# Patient Record
Sex: MALE | Race: OTHER | HISPANIC OR LATINO | Employment: FULL TIME | URBAN - METROPOLITAN AREA
[De-identification: names, ages, dates, MRNs, and addresses within clinical notes are randomized per-mention and may not be internally consistent; named-entity substitution may affect disease eponyms.]

---

## 2017-04-07 ENCOUNTER — HOSPITAL ENCOUNTER (EMERGENCY)
Facility: HOSPITAL | Age: 27
Discharge: HOME | End: 2017-04-07
Attending: EMERGENCY MEDICINE | Admitting: EMERGENCY MEDICINE
Payer: COMMERCIAL

## 2017-04-07 ENCOUNTER — APPOINTMENT (EMERGENCY)
Dept: RADIOLOGY | Facility: HOSPITAL | Age: 27
End: 2017-04-07
Payer: COMMERCIAL

## 2017-04-07 VITALS
TEMPERATURE: 97.6 F | SYSTOLIC BLOOD PRESSURE: 139 MMHG | HEART RATE: 84 BPM | RESPIRATION RATE: 18 BRPM | OXYGEN SATURATION: 97 % | DIASTOLIC BLOOD PRESSURE: 75 MMHG | WEIGHT: 263 LBS

## 2017-04-07 DIAGNOSIS — R51 HEADACHE(784.0): Primary | ICD-10-CM

## 2017-04-07 LAB
ANION GAP BLD CALC-SCNC: 18 MMOL/L (ref 4–13)
BUN BLD-MCNC: 11 MG/DL (ref 5–25)
CA-I BLD-SCNC: 1.19 MMOL/L (ref 1.12–1.32)
CHLORIDE BLD-SCNC: 101 MMOL/L (ref 100–108)
CREAT BLD-MCNC: 0.7 MG/DL (ref 0.6–1.3)
GFR SERPL CREATININE-BSD FRML MDRD: >60 ML/MIN/1.73SQ M
GLUCOSE SERPL-MCNC: 104 MG/DL (ref 65–140)
HCT VFR BLD CALC: 48 % (ref 36.5–49.3)
HGB BLDA-MCNC: 16.3 G/DL (ref 12–17)
PCO2 BLD: 26 MMOL/L (ref 21–32)
POTASSIUM BLD-SCNC: 3.5 MMOL/L (ref 3.5–5.3)
SODIUM BLD-SCNC: 141 MMOL/L (ref 136–145)
SPECIMEN SOURCE: ABNORMAL

## 2017-04-07 PROCEDURE — 70496 CT ANGIOGRAPHY HEAD: CPT

## 2017-04-07 PROCEDURE — 85014 HEMATOCRIT: CPT

## 2017-04-07 PROCEDURE — 99284 EMERGENCY DEPT VISIT MOD MDM: CPT

## 2017-04-07 PROCEDURE — 70498 CT ANGIOGRAPHY NECK: CPT

## 2017-04-07 PROCEDURE — 96375 TX/PRO/DX INJ NEW DRUG ADDON: CPT

## 2017-04-07 PROCEDURE — 96361 HYDRATE IV INFUSION ADD-ON: CPT

## 2017-04-07 PROCEDURE — 80047 BASIC METABLC PNL IONIZED CA: CPT

## 2017-04-07 PROCEDURE — 96374 THER/PROPH/DIAG INJ IV PUSH: CPT

## 2017-04-07 RX ORDER — KETOROLAC TROMETHAMINE 30 MG/ML
15 INJECTION, SOLUTION INTRAMUSCULAR; INTRAVENOUS ONCE
Status: COMPLETED | OUTPATIENT
Start: 2017-04-07 | End: 2017-04-07

## 2017-04-07 RX ORDER — DIPHENHYDRAMINE HYDROCHLORIDE 50 MG/ML
25 INJECTION INTRAMUSCULAR; INTRAVENOUS ONCE
Status: COMPLETED | OUTPATIENT
Start: 2017-04-07 | End: 2017-04-07

## 2017-04-07 RX ORDER — METOCLOPRAMIDE HYDROCHLORIDE 5 MG/ML
10 INJECTION INTRAMUSCULAR; INTRAVENOUS ONCE
Status: COMPLETED | OUTPATIENT
Start: 2017-04-07 | End: 2017-04-07

## 2017-04-07 RX ADMIN — KETOROLAC TROMETHAMINE 15 MG: 30 INJECTION, SOLUTION INTRAMUSCULAR at 16:41

## 2017-04-07 RX ADMIN — SODIUM CHLORIDE 1000 ML: 0.9 INJECTION, SOLUTION INTRAVENOUS at 16:20

## 2017-04-07 RX ADMIN — DIPHENHYDRAMINE HYDROCHLORIDE 25 MG: 50 INJECTION, SOLUTION INTRAMUSCULAR; INTRAVENOUS at 16:40

## 2017-04-07 RX ADMIN — METOCLOPRAMIDE 10 MG: 5 INJECTION, SOLUTION INTRAMUSCULAR; INTRAVENOUS at 16:41

## 2017-04-07 RX ADMIN — IOHEXOL 90 ML: 350 INJECTION, SOLUTION INTRAVENOUS at 17:12

## 2020-09-05 ENCOUNTER — CLINICAL SUPPORT (OUTPATIENT)
Dept: URGENT CARE | Age: 30
End: 2020-09-05

## 2020-09-05 ENCOUNTER — TRANSCRIBE ORDERS (OUTPATIENT)
Dept: ADMINISTRATIVE | Age: 30
End: 2020-09-05

## 2020-09-05 DIAGNOSIS — R11.2 NAUSEA AND VOMITING, INTRACTABILITY OF VOMITING NOT SPECIFIED, UNSPECIFIED VOMITING TYPE: ICD-10-CM

## 2020-09-05 DIAGNOSIS — R11.2 NAUSEA AND VOMITING, INTRACTABILITY OF VOMITING NOT SPECIFIED, UNSPECIFIED VOMITING TYPE: Primary | ICD-10-CM

## 2020-09-05 PROCEDURE — U0003 INFECTIOUS AGENT DETECTION BY NUCLEIC ACID (DNA OR RNA); SEVERE ACUTE RESPIRATORY SYNDROME CORONAVIRUS 2 (SARS-COV-2) (CORONAVIRUS DISEASE [COVID-19]), AMPLIFIED PROBE TECHNIQUE, MAKING USE OF HIGH THROUGHPUT TECHNOLOGIES AS DESCRIBED BY CMS-2020-01-R: HCPCS

## 2020-09-07 LAB — SARS-COV-2 RNA SPEC QL NAA+PROBE: NOT DETECTED

## 2020-09-09 ENCOUNTER — TELEPHONE (OUTPATIENT)
Dept: URGENT CARE | Age: 30
End: 2020-09-09

## 2021-04-30 ENCOUNTER — HOSPITAL ENCOUNTER (OUTPATIENT)
Dept: RADIOLOGY | Facility: HOSPITAL | Age: 31
Discharge: HOME | End: 2021-04-30
Payer: COMMERCIAL

## 2021-04-30 ENCOUNTER — TRANSCRIBE ORDERS (OUTPATIENT)
Dept: ADMINISTRATIVE | Facility: HOSPITAL | Age: 31
End: 2021-04-30

## 2021-04-30 DIAGNOSIS — M54.2 CERVICALGIA: ICD-10-CM

## 2021-04-30 DIAGNOSIS — M54.2 CERVICALGIA: Primary | ICD-10-CM

## 2021-04-30 PROCEDURE — 72050 X-RAY EXAM NECK SPINE 4/5VWS: CPT

## 2021-06-11 ENCOUNTER — APPOINTMENT (OUTPATIENT)
Dept: LAB | Facility: HOSPITAL | Age: 31
End: 2021-06-11
Payer: COMMERCIAL

## 2021-06-11 ENCOUNTER — TRANSCRIBE ORDERS (OUTPATIENT)
Dept: ADMINISTRATIVE | Facility: HOSPITAL | Age: 31
End: 2021-06-11

## 2021-06-11 DIAGNOSIS — E78.5 HYPERLIPIDEMIA, UNSPECIFIED HYPERLIPIDEMIA TYPE: ICD-10-CM

## 2021-06-11 DIAGNOSIS — Z86.711 PERSONAL HISTORY OF PE (PULMONARY EMBOLISM): ICD-10-CM

## 2021-06-11 DIAGNOSIS — D64.9 ANEMIA, UNSPECIFIED TYPE: Primary | ICD-10-CM

## 2021-06-11 DIAGNOSIS — K76.0 FATTY METAMORPHOSIS OF LIVER: ICD-10-CM

## 2021-06-11 DIAGNOSIS — D64.9 ANEMIA, UNSPECIFIED TYPE: ICD-10-CM

## 2021-06-11 DIAGNOSIS — M12.9 ACUTE ARTHROPATHY: ICD-10-CM

## 2021-06-11 LAB
ALBUMIN SERPL BCP-MCNC: 4.4 G/DL (ref 3.4–4.8)
ALP SERPL-CCNC: 48.9 U/L (ref 10–129)
ALT SERPL W P-5'-P-CCNC: 40 U/L (ref 5–63)
ANION GAP SERPL CALCULATED.3IONS-SCNC: 8 MMOL/L (ref 4–13)
AST SERPL W P-5'-P-CCNC: 21 U/L (ref 15–41)
BASOPHILS # BLD AUTO: 0.03 THOUSANDS/ΜL (ref 0–0.1)
BASOPHILS NFR BLD AUTO: 0 % (ref 0–1)
BILIRUB SERPL-MCNC: 0.52 MG/DL (ref 0.3–1.2)
BUN SERPL-MCNC: 14 MG/DL (ref 6–20)
CALCIUM SERPL-MCNC: 9.2 MG/DL (ref 8.4–10.2)
CHLORIDE SERPL-SCNC: 104 MMOL/L (ref 96–108)
CHOLEST SERPL-MCNC: 252 MG/DL
CO2 SERPL-SCNC: 27 MMOL/L (ref 22–33)
CREAT SERPL-MCNC: 0.86 MG/DL (ref 0.5–1.2)
EOSINOPHIL # BLD AUTO: 0.19 THOUSAND/ΜL (ref 0–0.61)
EOSINOPHIL NFR BLD AUTO: 2 % (ref 0–6)
ERYTHROCYTE [DISTWIDTH] IN BLOOD BY AUTOMATED COUNT: 13.2 % (ref 11.6–15.1)
GFR SERPL CREATININE-BSD FRML MDRD: 117 ML/MIN/1.73SQ M
GLUCOSE P FAST SERPL-MCNC: 89 MG/DL (ref 70–105)
HCT VFR BLD AUTO: 45.5 % (ref 36.5–49.3)
HDLC SERPL-MCNC: 38 MG/DL
HGB BLD-MCNC: 15.6 G/DL (ref 12–17)
IMM GRANULOCYTES # BLD AUTO: 0.01 THOUSAND/UL (ref 0–0.2)
IMM GRANULOCYTES NFR BLD AUTO: 0 % (ref 0–2)
LDLC SERPL CALC-MCNC: 164 MG/DL (ref 0–100)
LYMPHOCYTES # BLD AUTO: 3.24 THOUSANDS/ΜL (ref 0.6–4.47)
LYMPHOCYTES NFR BLD AUTO: 31 % (ref 14–44)
MCH RBC QN AUTO: 30.2 PG (ref 26.8–34.3)
MCHC RBC AUTO-ENTMCNC: 34.3 G/DL (ref 31.4–37.4)
MCV RBC AUTO: 88 FL (ref 82–98)
MONOCYTES # BLD AUTO: 1.1 THOUSAND/ΜL (ref 0.17–1.22)
MONOCYTES NFR BLD AUTO: 10 % (ref 4–12)
NEUTROPHILS # BLD AUTO: 6.02 THOUSANDS/ΜL (ref 1.85–7.62)
NEUTS SEG NFR BLD AUTO: 57 % (ref 43–75)
NONHDLC SERPL-MCNC: 214 MG/DL
PLATELET # BLD AUTO: 201 THOUSANDS/UL (ref 149–390)
PMV BLD AUTO: 11.5 FL (ref 8.9–12.7)
POTASSIUM SERPL-SCNC: 3.9 MMOL/L (ref 3.5–5)
PROT SERPL-MCNC: 7.5 G/DL (ref 6.4–8.3)
RBC # BLD AUTO: 5.16 MILLION/UL (ref 3.88–5.62)
SODIUM SERPL-SCNC: 139 MMOL/L (ref 133–145)
TRIGL SERPL-MCNC: 251.1 MG/DL
TSH SERPL DL<=0.05 MIU/L-ACNC: 0.76 UIU/ML (ref 0.34–5.6)
URATE SERPL-MCNC: 5.8 MG/DL (ref 3.4–8.7)
WBC # BLD AUTO: 10.59 THOUSAND/UL (ref 4.31–10.16)

## 2021-06-11 PROCEDURE — 84443 ASSAY THYROID STIM HORMONE: CPT

## 2021-06-11 PROCEDURE — 85025 COMPLETE CBC W/AUTO DIFF WBC: CPT

## 2021-06-11 PROCEDURE — 80061 LIPID PANEL: CPT

## 2021-06-11 PROCEDURE — 86038 ANTINUCLEAR ANTIBODIES: CPT

## 2021-06-11 PROCEDURE — 80053 COMPREHEN METABOLIC PANEL: CPT

## 2021-06-11 PROCEDURE — 82607 VITAMIN B-12: CPT

## 2021-06-11 PROCEDURE — 86141 C-REACTIVE PROTEIN HS: CPT

## 2021-06-11 PROCEDURE — 84550 ASSAY OF BLOOD/URIC ACID: CPT

## 2021-06-11 PROCEDURE — 36415 COLL VENOUS BLD VENIPUNCTURE: CPT

## 2021-06-12 LAB
CRP SERPL HS-MCNC: 1.85 MG/L
VIT B12 SERPL-MCNC: 986 PG/ML (ref 100–900)

## 2021-06-14 LAB — RYE IGE QN: NEGATIVE

## 2021-08-18 ENCOUNTER — OFFICE VISIT (OUTPATIENT)
Dept: URGENT CARE | Age: 31
End: 2021-08-18
Payer: COMMERCIAL

## 2021-08-18 VITALS
TEMPERATURE: 96.8 F | HEIGHT: 67 IN | HEART RATE: 83 BPM | WEIGHT: 255 LBS | BODY MASS INDEX: 40.02 KG/M2 | OXYGEN SATURATION: 97 %

## 2021-08-18 DIAGNOSIS — Z11.59 SPECIAL SCREENING EXAMINATION FOR VIRAL DISEASE: Primary | ICD-10-CM

## 2021-08-18 PROCEDURE — 99213 OFFICE O/P EST LOW 20 MIN: CPT | Performed by: PHYSICIAN ASSISTANT

## 2021-08-18 PROCEDURE — U0003 INFECTIOUS AGENT DETECTION BY NUCLEIC ACID (DNA OR RNA); SEVERE ACUTE RESPIRATORY SYNDROME CORONAVIRUS 2 (SARS-COV-2) (CORONAVIRUS DISEASE [COVID-19]), AMPLIFIED PROBE TECHNIQUE, MAKING USE OF HIGH THROUGHPUT TECHNOLOGIES AS DESCRIBED BY CMS-2020-01-R: HCPCS | Performed by: PHYSICIAN ASSISTANT

## 2021-08-18 PROCEDURE — U0005 INFEC AGEN DETEC AMPLI PROBE: HCPCS | Performed by: PHYSICIAN ASSISTANT

## 2021-08-18 RX ORDER — ATORVASTATIN CALCIUM 20 MG/1
TABLET, FILM COATED ORAL
COMMUNITY
Start: 2021-06-15

## 2021-08-18 RX ORDER — ALPRAZOLAM 0.5 MG/1
1 TABLET ORAL 3 TIMES DAILY
COMMUNITY
Start: 2021-08-04

## 2021-08-18 RX ORDER — FLUOXETINE HYDROCHLORIDE 40 MG/1
CAPSULE ORAL
COMMUNITY
Start: 2021-08-04

## 2021-08-18 NOTE — LETTER
August 18, 2021     Patient: Louvella Pill   YOB: 1990   Date of Visit: 8/18/2021       To Whom It May Concern: It is my medical opinion that Geo Severe   Should remain out of work until test results are available  If you have any questions or concerns, please don't hesitate to call           Sincerely,        Joel Miranda PA-C    CC: No Recipients

## 2021-08-18 NOTE — PROGRESS NOTES
[facility]        NAME: Rosalio Guardado is a 36 y o  male  : 1990    MRN: [id number]  DATE: 2021  TIME: 6:47 PM    Assessment and Plan   Special screening examination for viral disease [Z11 59]  1  Special screening examination for viral disease  Novel Coronavirus (Covid-19),[facility]         Patient Instructions     Follow up with PCP in 3-5 days  Proceed to  ER if symptoms worsen  Chief Complaint     Chief Complaint   Patient presents with    Cold Like Symptoms     Pt c/o cough, chills and sore throat x two days  Pt exposed to girlfriend who tested positive for Covid-19 today  Pt not vaccinated against Covid-19  No OTC meds taken  History of Present Illness        21-year-old male presents for likely COVID symptoms  Patient states his girlfriend tested positive for COVID  He states she became sick on Monday and got her results today  He states he started not feeling well on Monday as well  He is complaining of coughing, chills and sweats  He denies any shortness of breath or chest pain  Denies any known fever  Patient is not vaccine and has never had COVID previously  Patient's girlfriend is not vaccinated either  Review of Systems   Review of Systems   Constitutional: Positive for chills, diaphoresis and fatigue  Negative for activity change, appetite change and fever  HENT: Negative  Eyes: Negative  Respiratory: Positive for cough  Negative for apnea, chest tightness, shortness of breath, wheezing and stridor  Cardiovascular: Negative  Gastrointestinal: Negative  Musculoskeletal: Positive for myalgias  Skin: Negative  Neurological: Negative for headaches           Current Medications       Current Outpatient Medications:     ALPRAZolam (XANAX) 0 5 mg tablet, Take 1 mg by mouth 3 (three) times a day, Disp: , Rfl:     atorvastatin (LIPITOR) 20 mg tablet, , Disp: , Rfl:     FLUoxetine (PROzac) 40 MG capsule, , Disp: , Rfl:     Current Allergies     Allergies as of 08/18/2021    (No Known Allergies)            The following portions of the patient's history were reviewed and updated as appropriate: allergies, current medications, past family history, past medical history, past social history, past surgical history and problem list     Objective   Pulse 83   Temp (!) 96 8 °F (36 °C)   Ht 5' 7" (1 702 m)   Wt 116 kg (255 lb)   SpO2 97%   BMI 39 94 kg/m²        Physical Exam     Physical Exam  Vitals and nursing note reviewed  Constitutional:       General: He is not in acute distress  Appearance: He is not ill-appearing  HENT:      Head: Normocephalic and atraumatic  Right Ear: Tympanic membrane, ear canal and external ear normal       Left Ear: Tympanic membrane, ear canal and external ear normal       Nose: Nose normal  No congestion or rhinorrhea  Mouth/Throat:      Mouth: Mucous membranes are moist       Pharynx: No oropharyngeal exudate or posterior oropharyngeal erythema  Eyes:      Conjunctiva/sclera: Conjunctivae normal    Cardiovascular:      Rate and Rhythm: Normal rate and regular rhythm  Pulmonary:      Effort: Pulmonary effort is normal       Breath sounds: Normal breath sounds  Lymphadenopathy:      Cervical: No cervical adenopathy  Neurological:      Mental Status: He is alert

## 2021-08-18 NOTE — PATIENT INSTRUCTIONS
COVID testing was obtained today  Isolate until test results are available  You may view your results on MyChart  If you do not have a MyChart, you can create one with the activation code you were given today  If results are negative and feeling well, may resume normal activities  If results are positive, will need to isolate for 10-14 days from onset of symptoms  Any worsening of symptoms especially any difficulty breathing go to the emergency room  Follow-up with your family doctor for further treatment if needed    You may take vitamin-D3 2000 IU daily                          vitamin-C 1 gram every 12 hours                         Multivitamin daily

## 2021-08-19 LAB — SARS-COV-2 RNA RESP QL NAA+PROBE: POSITIVE

## 2023-09-13 ENCOUNTER — HOSPITAL ENCOUNTER (EMERGENCY)
Facility: HOSPITAL | Age: 33
Discharge: HOME | End: 2023-09-13
Attending: EMERGENCY MEDICINE | Admitting: EMERGENCY MEDICINE
Payer: COMMERCIAL

## 2023-09-13 VITALS
SYSTOLIC BLOOD PRESSURE: 138 MMHG | OXYGEN SATURATION: 98 % | HEART RATE: 100 BPM | RESPIRATION RATE: 18 BRPM | BODY MASS INDEX: 40.05 KG/M2 | TEMPERATURE: 100 F | WEIGHT: 255.73 LBS | DIASTOLIC BLOOD PRESSURE: 87 MMHG

## 2023-09-13 DIAGNOSIS — J02.0 STREP PHARYNGITIS: Primary | ICD-10-CM

## 2023-09-13 DIAGNOSIS — M79.10 MYALGIA: ICD-10-CM

## 2023-09-13 DIAGNOSIS — R68.89 FLU-LIKE SYMPTOMS: ICD-10-CM

## 2023-09-13 DIAGNOSIS — R50.9 FEVER: ICD-10-CM

## 2023-09-13 LAB
S PYO DNA THROAT QL NAA+PROBE: DETECTED
SARS-COV-2 RNA RESP QL NAA+PROBE: NEGATIVE

## 2023-09-13 PROCEDURE — 99284 EMERGENCY DEPT VISIT MOD MDM: CPT | Performed by: EMERGENCY MEDICINE

## 2023-09-13 PROCEDURE — 87635 SARS-COV-2 COVID-19 AMP PRB: CPT | Performed by: EMERGENCY MEDICINE

## 2023-09-13 PROCEDURE — 94640 AIRWAY INHALATION TREATMENT: CPT

## 2023-09-13 PROCEDURE — 96372 THER/PROPH/DIAG INJ SC/IM: CPT

## 2023-09-13 PROCEDURE — 99283 EMERGENCY DEPT VISIT LOW MDM: CPT

## 2023-09-13 PROCEDURE — 87651 STREP A DNA AMP PROBE: CPT | Performed by: EMERGENCY MEDICINE

## 2023-09-13 RX ORDER — ALBUTEROL SULFATE 90 UG/1
2 AEROSOL, METERED RESPIRATORY (INHALATION) EVERY 6 HOURS PRN
Qty: 18 G | Refills: 0 | Status: SHIPPED | OUTPATIENT
Start: 2023-09-13

## 2023-09-13 RX ORDER — AMOXICILLIN 500 MG/1
500 CAPSULE ORAL EVERY 12 HOURS SCHEDULED
Qty: 14 CAPSULE | Refills: 0 | Status: SHIPPED | OUTPATIENT
Start: 2023-09-13 | End: 2023-09-20

## 2023-09-13 RX ORDER — KETOROLAC TROMETHAMINE 30 MG/ML
30 INJECTION, SOLUTION INTRAMUSCULAR; INTRAVENOUS ONCE
Status: COMPLETED | OUTPATIENT
Start: 2023-09-13 | End: 2023-09-13

## 2023-09-13 RX ADMIN — KETOROLAC TROMETHAMINE 30 MG: 30 INJECTION INTRAMUSCULAR; INTRAVENOUS at 08:36

## 2023-09-13 RX ADMIN — ALBUTEROL SULFATE 5 MG: 2.5 SOLUTION RESPIRATORY (INHALATION) at 08:08

## 2023-09-13 RX ADMIN — Medication 16 MG: at 08:06

## 2023-09-13 RX ADMIN — IPRATROPIUM BROMIDE 0.5 MG: 0.5 SOLUTION RESPIRATORY (INHALATION) at 08:09

## 2023-09-13 NOTE — Clinical Note
Cierra Chamorro was seen and treated in our emergency department on 9/13/2023. Diagnosis:     Finesse Arellano  may return to work on return date. He may return on this date: 09/18/2023         If you have any questions or concerns, please don't hesitate to call.       Cole Encarnacion, DO    ______________________________           _______________          _______________  Hospital Representative                              Date                                Time

## 2023-09-13 NOTE — ED PROVIDER NOTES
History  Chief Complaint   Patient presents with   • Generalized Body Aches     Started yesterday with body aches shaking headache and fever      70-year-old male complaining of 1 day history of flulike symptoms including body aches, headache, fever, sore throat. Also complains of occasional dry cough. Denies n/v or chest pain. Denies any neck pain. He states he has children that are sick with strep pharyngitis. No recent travel. Patient not immunocompromise. Patient is a smoker. Prior to Admission Medications   Prescriptions Last Dose Informant Patient Reported? Taking? ALPRAZolam (XANAX) 0.5 mg tablet Not Taking  Yes No   Sig: Take 1 mg by mouth 3 (three) times a day   Patient not taking: Reported on 9/13/2023   FLUoxetine (PROzac) 40 MG capsule Not Taking  Yes No   Patient not taking: Reported on 9/13/2023   atorvastatin (LIPITOR) 20 mg tablet Not Taking  Yes No   Patient not taking: Reported on 9/13/2023      Facility-Administered Medications: None       Past Medical History:   Diagnosis Date   • Anxiety    • Asthma    • Depression        Past Surgical History:   Procedure Laterality Date   • TONSILLECTOMY         History reviewed. No pertinent family history. I have reviewed and agree with the history as documented. E-Cigarette/Vaping   • E-Cigarette Use Never User      E-Cigarette/Vaping Substances     Social History     Tobacco Use   • Smoking status: Never   Vaping Use   • Vaping Use: Never used   Substance Use Topics   • Alcohol use: Yes     Comment: rare   • Drug use: No       Review of Systems   Constitutional: Negative for activity change, appetite change, chills and fever. HENT: Positive for rhinorrhea and sore throat. Negative for ear pain, hearing loss, sneezing and trouble swallowing. Eyes: Negative for pain and visual disturbance. Respiratory: Positive for cough. Negative for choking, chest tightness, shortness of breath, wheezing and stridor.     Cardiovascular: Negative for chest pain, palpitations and leg swelling. Gastrointestinal: Negative for abdominal pain, constipation, diarrhea, nausea and vomiting. Genitourinary: Negative for difficulty urinating, dysuria, frequency, hematuria and testicular pain. Musculoskeletal: Positive for myalgias. Negative for arthralgias, back pain, gait problem and neck pain. Skin: Negative for color change and rash. Allergic/Immunologic: Negative for immunocompromised state. Neurological: Negative for dizziness, seizures, syncope, speech difficulty, weakness, light-headedness, numbness and headaches. Psychiatric/Behavioral: Negative for confusion. The patient is not nervous/anxious. All other systems reviewed and are negative. Physical Exam  Physical Exam  Vitals and nursing note reviewed. Constitutional:       General: He is not in acute distress. Appearance: Normal appearance. He is well-developed and normal weight. He is not ill-appearing, toxic-appearing or diaphoretic. HENT:      Head: Normocephalic and atraumatic. Right Ear: Tympanic membrane, ear canal and external ear normal. There is no impacted cerumen. Left Ear: Tympanic membrane, ear canal and external ear normal. There is no impacted cerumen. Nose: Rhinorrhea present. Mouth/Throat:      Mouth: Mucous membranes are moist.      Pharynx: Oropharyngeal exudate and posterior oropharyngeal erythema present. Eyes:      General: No scleral icterus. Extraocular Movements: Extraocular movements intact. Conjunctiva/sclera: Conjunctivae normal.   Cardiovascular:      Rate and Rhythm: Normal rate and regular rhythm. Pulses: Normal pulses. Heart sounds: Normal heart sounds. No murmur heard. Pulmonary:      Effort: Pulmonary effort is normal. No respiratory distress. Breath sounds: Normal breath sounds. No wheezing or rales. Chest:      Chest wall: No tenderness.    Abdominal:      General: Bowel sounds are normal. There is no distension. Palpations: Abdomen is soft. There is no mass. Tenderness: There is no abdominal tenderness. There is no guarding or rebound. Musculoskeletal:         General: No swelling, tenderness or deformity. Normal range of motion. Cervical back: Normal range of motion and neck supple. No rigidity. Right lower leg: No edema. Left lower leg: No edema. Lymphadenopathy:      Cervical: Cervical adenopathy present. Skin:     General: Skin is warm and dry. Capillary Refill: Capillary refill takes less than 2 seconds. Findings: No erythema, lesion or rash. Neurological:      General: No focal deficit present. Mental Status: He is alert and oriented to person, place, and time. Motor: No abnormal muscle tone.    Psychiatric:         Mood and Affect: Mood normal.         Behavior: Behavior normal.         Vital Signs  ED Triage Vitals   Temperature Pulse Respirations Blood Pressure SpO2   09/13/23 0734 09/13/23 0734 09/13/23 0734 09/13/23 0741 09/13/23 0734   100 °F (37.8 °C) 100 18 138/87 98 %      Temp Source Heart Rate Source Patient Position - Orthostatic VS BP Location FiO2 (%)   09/13/23 0734 09/13/23 0734 09/13/23 0734 09/13/23 0734 --   Temporal Monitor Sitting Left arm       Pain Score       09/13/23 0734       9           Vitals:    09/13/23 0734 09/13/23 0741   BP:  138/87   Pulse: 100    Patient Position - Orthostatic VS: Sitting Sitting         Visual Acuity      ED Medications  Medications   dexamethasone oral liquid 16 mg 1.6 mL (16 mg Oral Given 9/13/23 0806)   albuterol inhalation solution 5 mg (5 mg Nebulization Given 9/13/23 0808)   ipratropium (ATROVENT) 0.02 % inhalation solution 0.5 mg (0.5 mg Nebulization Given 9/13/23 0809)   ketorolac (TORADOL) injection 30 mg (30 mg Intramuscular Given 9/13/23 0836)       Diagnostic Studies  Results Reviewed     Procedure Component Value Units Date/Time    COVID only [[id number]]  (Normal) Collected: 09/13/23 1775    Lab Status: Final result Specimen: Nares from Nose Updated: 09/13/23 0830     SARS-CoV-2 Negative    Narrative:      FOR PEDIATRIC PATIENTS - copy/paste COVID Guidelines URL to browser: [email or web address]    SARS-CoV-2 assay is a Nucleic Acid Amplification assay intended for the  qualitative detection of nucleic acid from SARS-CoV-2 in nasopharyngeal  swabs. Results are for the presumptive identification of SARS-CoV-2 RNA. Positive results are indicative of infection with SARS-CoV-2, the virus  causing COVID-19, but do not rule out bacterial infection or co-infection  with other viruses. Laboratories within the [facility] and its  territories are required to report all positive results to the appropriate  public health authorities. Negative results do not preclude SARS-CoV-2  infection and should not be used as the sole basis for treatment or other  patient management decisions. Negative results must be combined with  clinical observations, patient history, and epidemiological information. This test has not been FDA cleared or approved. This test has been authorized by FDA under an Emergency Use Authorization  (EUA). This test is only authorized for the duration of time the  declaration that circumstances exist justifying the authorization of the  emergency use of an in vitro diagnostic tests for detection of SARS-CoV-2  virus and/or diagnosis of COVID-19 infection under section 564(b)(1) of  the Act, 21 U. S.C. 384RFS-3(S)(3), unless the authorization is terminated  or revoked sooner. The test has been validated but independent review by FDA  and CLIA is pending. Test performed using [employer]: This RT-PCR assay targets N2,  a region unique to SARS-CoV-2. A conserved region in the E-gene was chosen  for pan-Sarbecovirus detection which includes SARS-CoV-2.     According to CMS-2020-01-R, this platform meets the definition of high-throughput technology. Strep A PCR [[id number]]  (Abnormal) Collected: 09/13/23 0750    Lab Status: Final result Specimen: Throat Updated: 09/13/23 0821     STREP A PCR Detected                 No orders to display              Procedures  Procedures         ED Course                               SBIRT 22yo+    Flowsheet Row Most Recent Value   Initial Alcohol Screen: US AUDIT-C     1. How often do you have a drink containing alcohol? 0 Filed at: 09/13/2023 0738   Audit-C Score 0 Filed at: 09/13/2023 8816   NORY: How many times in the past year have you. .. Used an illegal drug or used a prescription medication for non-medical reasons? Never Filed at: 09/13/2023 8686                    Medical Decision Making  79-year-old male presenting with flulike illness since yesterday. Fever: acute illness or injury  Flu-like symptoms: acute illness or injury  Myalgia: acute illness or injury  Strep pharyngitis: acute illness or injury  Amount and/or Complexity of Data Reviewed  Labs: ordered. Decision-making details documented in ED Course. Risk  OTC drugs. Prescription drug management. Risk Details: History strep swab is positive. Will treat with amoxicillin. Patient improved after nebulizer treatments. No evidence for sepsis, severe sepsis or septic shock.         Disposition  Final diagnoses:   Strep pharyngitis   Myalgia   Fever   Flu-like symptoms     Time reflects when diagnosis was documented in both MDM as applicable and the Disposition within this note     Time User Action Codes Description Comment    9/13/2023  8:32 AM Radene Steff T Add [J02.0] Strep pharyngitis     9/13/2023  8:32 AM Radene Steff T Add [M79.10] Myalgia     9/13/2023  8:32 AM Radene Steff T Add [R50.9] Fever     9/13/2023  8:32 AM Tripp Smack Add [R68.89] Flu-like symptoms       ED Disposition     ED Disposition   Discharge    Condition   Stable    Date/Time   Wed Sep 13, 2023  8:32 AM    Comment   Rosalba Kaiser Brian discharge to home/self care. Follow-up Information     Follow up With Specialties Details Why Shane Powell MD [facility] Schedule an appointment as soon as possible for a visit   [address] [facility]            Discharge Medication List as of 9/13/2023  8:33 AM      START taking these medications    Details   albuterol (Ventolin HFA) 90 mcg/act inhaler Inhale 2 puffs every 6 (six) hours as needed for wheezing, Starting Wed 9/13/2023, Normal      amoxicillin (AMOXIL) 500 mg capsule Take 1 capsule (500 mg total) by mouth every 12 (twelve) hours for 7 days, Starting Wed 9/13/2023, Until Wed 9/20/2023, Normal         CONTINUE these medications which have NOT CHANGED    Details   ALPRAZolam (XANAX) 0.5 mg tablet Take 1 mg by mouth 3 (three) times a day, Starting Wed 8/4/2021, Historical Med      atorvastatin (LIPITOR) 20 mg tablet Starting Tue 6/15/2021, Historical Med      FLUoxetine (PROzac) 40 MG capsule Starting Wed 8/4/2021, Historical Med             No discharge procedures on file.     PDMP Review     None          ED Provider  Electronically Signed by           Werner Larsen DO  09/13/23 1000

## 2023-09-13 NOTE — ED NOTES
Patient reports son has been diagnosed with strep throat and other son is being treated for MRSA     Peggy Cook RN  09/13/23 6090

## 2023-10-19 ENCOUNTER — OFFICE VISIT (OUTPATIENT)
Dept: URGENT CARE | Facility: CLINIC | Age: 33
End: 2023-10-19
Payer: COMMERCIAL

## 2023-10-19 VITALS
HEIGHT: 67 IN | RESPIRATION RATE: 18 BRPM | SYSTOLIC BLOOD PRESSURE: 136 MMHG | HEART RATE: 86 BPM | OXYGEN SATURATION: 100 % | DIASTOLIC BLOOD PRESSURE: 90 MMHG | WEIGHT: 246 LBS | BODY MASS INDEX: 38.61 KG/M2 | TEMPERATURE: 99 F

## 2023-10-19 DIAGNOSIS — J02.0 STREP PHARYNGITIS: Primary | ICD-10-CM

## 2023-10-19 DIAGNOSIS — J02.9 SORE THROAT: ICD-10-CM

## 2023-10-19 LAB — S PYO AG THROAT QL: NEGATIVE

## 2023-10-19 PROCEDURE — 87880 STREP A ASSAY W/OPTIC: CPT | Performed by: STUDENT IN AN ORGANIZED HEALTH CARE EDUCATION/TRAINING PROGRAM

## 2023-10-19 PROCEDURE — 99213 OFFICE O/P EST LOW 20 MIN: CPT | Performed by: STUDENT IN AN ORGANIZED HEALTH CARE EDUCATION/TRAINING PROGRAM

## 2023-10-19 RX ORDER — METHYLPREDNISOLONE 4 MG/1
TABLET ORAL
Qty: 21 EACH | Refills: 0 | Status: SHIPPED | OUTPATIENT
Start: 2023-10-19

## 2023-10-19 RX ORDER — PENICILLIN V POTASSIUM 500 MG/1
500 TABLET ORAL 2 TIMES DAILY
Qty: 20 TABLET | Refills: 0 | Status: SHIPPED | OUTPATIENT
Start: 2023-10-19 | End: 2023-10-29

## 2023-10-19 NOTE — PROGRESS NOTES
[facility]        NAME: Lashanda Weiss is a 34 y.o. male  : 1990    MRN: [id number]  DATE: 2023  TIME: 8:15 PM    Assessment and Orders   Strep pharyngitis [J02.0]  1. Strep pharyngitis  penicillin V potassium (VEETID) 500 mg tablet    methylPREDNISolone 4 MG tablet therapy pack      2. Sore throat  POCT rapid strepA    CANCELED: Throat culture            Plan and Discussion      Rapid strep is negative however patient has strep pos contact and the sides of the pharynx are extremely swollen with exudate. Will treat with oral penicillin to cover for strep infection. Will also treat with oral steroid to decreased swelling of the pharynx. Discussed ED precautions including (but not limited to)  Difficultly breathing or shortness of breath  Chest pain  Acutely worsening symptoms. Risks and benefits discussed. Patient understands and agrees with the plan. Follow up with PCP. Chief Complaint     Chief Complaint   Patient presents with    Sore Throat     Sore throat on left side for 3 days         History of Present Illness       Sore Throat   This is a new problem. The current episode started in the past 7 days. The problem has been unchanged. The pain is worse on the left side. There has been no fever. Associated symptoms include congestion and trouble swallowing. Pertinent negatives include no coughing or vomiting. He has had exposure to strep. Exposure to: son had strep. He has tried NSAIDs and acetaminophen for the symptoms. The treatment provided mild relief. Review of Systems   Review of Systems   HENT:  Positive for congestion, sore throat and trouble swallowing. Respiratory:  Negative for cough. Gastrointestinal:  Negative for vomiting.          Current Medications       Current Outpatient Medications:     methylPREDNISolone 4 MG tablet therapy pack, Use as directed on package, Disp: 21 each, Rfl: 0    penicillin V potassium (VEETID) 500 mg tablet, Take 1 tablet (500 mg total) by mouth 2 (two) times a day for 10 days, Disp: 20 tablet, Rfl: 0    albuterol (Ventolin HFA) 90 mcg/act inhaler, Inhale 2 puffs every 6 (six) hours as needed for wheezing (Patient not taking: Reported on 10/19/2023), Disp: 18 g, Rfl: 0    ALPRAZolam (XANAX) 0.5 mg tablet, Take 1 mg by mouth 3 (three) times a day (Patient not taking: Reported on 9/13/2023), Disp: , Rfl:     atorvastatin (LIPITOR) 20 mg tablet, , Disp: , Rfl:     FLUoxetine (PROzac) 40 MG capsule, , Disp: , Rfl:     Current Allergies     Allergies as of 10/19/2023    (No Known Allergies)            The following portions of the patient's history were reviewed and updated as appropriate: allergies, current medications, past family history, past medical history, past social history, past surgical history and problem list.     Past Medical History:   Diagnosis Date    Anxiety     Asthma     Depression        Past Surgical History:   Procedure Laterality Date    TONSILLECTOMY         No family history on file. Medications have been verified. Objective   /90   Pulse 86   Temp 99 °F (37.2 °C) (Temporal)   Resp 18   Ht 5' 7" (1.702 m)   Wt 112 kg (246 lb)   SpO2 100%   BMI 38.53 kg/m²   No LMP for male patient. Physical Exam     Physical Exam  Constitutional:       General: He is not in acute distress. Appearance: He is not ill-appearing. HENT:      Mouth/Throat:      Pharynx: Pharyngeal swelling and oropharyngeal exudate present. Comments: Tonsils are surgically absent   Pulmonary:      Effort: Pulmonary effort is normal. No respiratory distress. Breath sounds: No stridor. Lymphadenopathy:      Cervical: Cervical adenopathy present. Neurological:      General: No focal deficit present. Mental Status: He is alert and oriented to person, place, and time.    Psychiatric:         Mood and Affect: Mood normal.         Behavior: Behavior normal.               Kathy Herrera DO

## 2024-02-01 ENCOUNTER — OFFICE VISIT (OUTPATIENT)
Dept: URGENT CARE | Facility: CLINIC | Age: 34
End: 2024-02-01
Payer: COMMERCIAL

## 2024-02-01 VITALS
OXYGEN SATURATION: 97 % | DIASTOLIC BLOOD PRESSURE: 98 MMHG | RESPIRATION RATE: 20 BRPM | SYSTOLIC BLOOD PRESSURE: 141 MMHG | HEART RATE: 92 BPM | TEMPERATURE: 99 F

## 2024-02-01 DIAGNOSIS — L24.9 IRRITANT DERMATITIS: Primary | ICD-10-CM

## 2024-02-01 PROCEDURE — 99213 OFFICE O/P EST LOW 20 MIN: CPT | Performed by: STUDENT IN AN ORGANIZED HEALTH CARE EDUCATION/TRAINING PROGRAM

## 2024-02-01 RX ORDER — TRIAMCINOLONE ACETONIDE 1 MG/G
CREAM TOPICAL 2 TIMES DAILY
Qty: 15 G | Refills: 1 | Status: SHIPPED | OUTPATIENT
Start: 2024-02-01 | End: 2024-02-08

## 2024-02-02 NOTE — PROGRESS NOTES
"  [facility]        NAME: Chin Torres is a 33 y.o. male  : 1990    MRN: [id number]  DATE: 2024  TIME: 8:52 AM    Assessment and Plan   Irritant dermatitis [L24.9]  1. Irritant dermatitis  Ambulatory Referral to Dermatology    triamcinolone (KENALOG) 0.1 % cream            Patient Instructions       Follow up with PCP in 3-5 days.  Proceed to  ER if symptoms worsen.    Chief Complaint     Chief Complaint   Patient presents with   • Rash     C/o generalized body rash onset \"one week\". Pt reports utilizing \"benadryl, not improving\". Denies itching, denies drainage, skin intact. Pt denies any other symptomology. Denies PCP cotnact. Denies any OTC medications today. Pt reports one episode of vomiting \"today\", denies n/d/abdominal pain association. Denies any hx of rash.          History of Present Illness       Rash  This is a new problem. The current episode started in the past 7 days. The problem is unchanged. The affected locations include the chest, right arm and left arm. The problem is moderate. The rash is characterized by redness and itchiness. He was exposed to nothing. The rash first occurred at home. Pertinent negatives include no congestion, cough, fever, rhinorrhea, sore throat or vomiting. Past treatments include antihistamine. The treatment provided no relief.     Review of Systems   Review of Systems   Constitutional:  Negative for fever.   HENT:  Negative for congestion, rhinorrhea and sore throat.    Respiratory:  Negative for cough.    Gastrointestinal:  Negative for vomiting.   Skin:  Positive for rash.       Current Medications       Current Outpatient Medications:   •  triamcinolone (KENALOG) 0.1 % cream, Apply topically 2 (two) times a day for 7 days, Disp: 15 g, Rfl: 1  •  albuterol (Ventolin HFA) 90 mcg/act inhaler, Inhale 2 puffs every 6 (six) hours as needed for wheezing (Patient not taking: Reported on 10/19/2023), Disp: 18 g, Rfl: 0  •  ALPRAZolam (XANAX) 0.5 mg " tablet, Take 1 mg by mouth 3 (three) times a day (Patient not taking: Reported on 9/13/2023), Disp: , Rfl:   •  atorvastatin (LIPITOR) 20 mg tablet, , Disp: , Rfl:   •  FLUoxetine (PROzac) 40 MG capsule, , Disp: , Rfl:   •  methylPREDNISolone 4 MG tablet therapy pack, Use as directed on package, Disp: 21 each, Rfl: 0    Current Allergies     Allergies as of 02/01/2024   • (No Known Allergies)            The following portions of the patient's history were reviewed and updated as appropriate: allergies, current medications, past family history, past medical history, past social history, past surgical history and problem list.     Past Medical History:   Diagnosis Date   • Anxiety    • Asthma    • Depression        Past Surgical History:   Procedure Laterality Date   • TONSILLECTOMY         History reviewed. No pertinent family history.      Medications have been verified.        Objective   /98   Pulse 92   Temp 99 °F (37.2 °C) (Temporal)   Resp 20   SpO2 97%   No LMP for male patient.       Physical Exam     Physical Exam  Constitutional:       General: He is not in acute distress.     Appearance: He is well-developed. He is not toxic-appearing.   HENT:      Head: Normocephalic and atraumatic.      Right Ear: External ear normal.      Left Ear: External ear normal.   Eyes:      Extraocular Movements: Extraocular movements intact.   Cardiovascular:      Rate and Rhythm: Normal rate.   Pulmonary:      Effort: Pulmonary effort is normal.   Skin:     Comments: Dry maculopapular rash on arms, upper chest, neck under beard   Neurological:      General: No focal deficit present.      Mental Status: He is alert and oriented to person, place, and time.   Psychiatric:         Mood and Affect: Mood normal.

## 2024-04-17 ENCOUNTER — HOSPITAL ENCOUNTER (OUTPATIENT)
Dept: CT IMAGING | Facility: HOSPITAL | Age: 34
Discharge: HOME | End: 2024-04-17
Payer: COMMERCIAL

## 2024-04-17 ENCOUNTER — APPOINTMENT (OUTPATIENT)
Dept: LAB | Facility: HOSPITAL | Age: 34
End: 2024-04-17
Payer: COMMERCIAL

## 2024-04-17 ENCOUNTER — HOSPITAL ENCOUNTER (OUTPATIENT)
Dept: ULTRASOUND IMAGING | Facility: HOSPITAL | Age: 34
Discharge: HOME | End: 2024-04-17
Payer: COMMERCIAL

## 2024-04-17 DIAGNOSIS — R31.9 BLOOD IN URINE: ICD-10-CM

## 2024-04-17 DIAGNOSIS — D52.9 ANEMIA DUE TO FOLIC ACID DEFICIENCY, UNSPECIFIED DEFICIENCY TYPE: ICD-10-CM

## 2024-04-17 DIAGNOSIS — R10.9 FLANK PAIN: ICD-10-CM

## 2024-04-17 DIAGNOSIS — D51.9 ANEMIA DUE TO VITAMIN B12 DEFICIENCY, UNSPECIFIED B12 DEFICIENCY TYPE: ICD-10-CM

## 2024-04-17 DIAGNOSIS — Z01.818 PREOP EXAMINATION: ICD-10-CM

## 2024-04-17 DIAGNOSIS — E78.5 HYPERLIPIDEMIA, UNSPECIFIED HYPERLIPIDEMIA TYPE: ICD-10-CM

## 2024-04-17 DIAGNOSIS — E55.9 VITAMIN D DEFICIENCY DISEASE: ICD-10-CM

## 2024-04-17 LAB
25(OH)D3 SERPL-MCNC: 10.6 NG/ML (ref 30–100)
ALBUMIN SERPL BCP-MCNC: 4.7 G/DL (ref 3.5–5)
ALP SERPL-CCNC: 47 U/L (ref 34–104)
ALT SERPL W P-5'-P-CCNC: 36 U/L (ref 7–52)
ANION GAP SERPL CALCULATED.3IONS-SCNC: 9 MMOL/L (ref 4–13)
AST SERPL W P-5'-P-CCNC: 21 U/L (ref 13–39)
BACTERIA UR QL AUTO: NORMAL /HPF
BASOPHILS # BLD MANUAL: 0 THOUSAND/UL (ref 0–0.1)
BASOPHILS NFR MAR MANUAL: 0 % (ref 0–1)
BILIRUB SERPL-MCNC: 0.7 MG/DL (ref 0.2–1)
BILIRUB UR QL STRIP: NEGATIVE
BUN SERPL-MCNC: 13 MG/DL (ref 5–25)
CALCIUM SERPL-MCNC: 9.7 MG/DL (ref 8.4–10.2)
CHLORIDE SERPL-SCNC: 104 MMOL/L (ref 96–108)
CLARITY UR: CLEAR
CO2 SERPL-SCNC: 25 MMOL/L (ref 21–32)
COLOR UR: YELLOW
CREAT SERPL-MCNC: 0.86 MG/DL (ref 0.6–1.3)
EOSINOPHIL # BLD MANUAL: 0 THOUSAND/UL (ref 0–0.4)
EOSINOPHIL NFR BLD MANUAL: 0 % (ref 0–6)
ERYTHROCYTE [DISTWIDTH] IN BLOOD BY AUTOMATED COUNT: 12.7 % (ref 11.6–15.1)
GFR SERPL CREATININE-BSD FRML MDRD: 114 ML/MIN/1.73SQ M
GLUCOSE P FAST SERPL-MCNC: 97 MG/DL (ref 65–99)
GLUCOSE UR STRIP-MCNC: NEGATIVE MG/DL
HCT VFR BLD AUTO: 46.7 % (ref 36.5–49.3)
HGB BLD-MCNC: 15.5 G/DL (ref 12–17)
HGB UR QL STRIP.AUTO: ABNORMAL
KETONES UR STRIP-MCNC: NEGATIVE MG/DL
LEUKOCYTE ESTERASE UR QL STRIP: NEGATIVE
LYMPHOCYTES # BLD AUTO: 2.48 THOUSAND/UL (ref 0.6–4.47)
LYMPHOCYTES # BLD AUTO: 25 % (ref 14–44)
MCH RBC QN AUTO: 30 PG (ref 26.8–34.3)
MCHC RBC AUTO-ENTMCNC: 33.2 G/DL (ref 31.4–37.4)
MCV RBC AUTO: 90 FL (ref 82–98)
METAMYELOCYTES NFR BLD MANUAL: 1 % (ref 0–1)
MONOCYTES # BLD AUTO: 0.68 THOUSAND/UL (ref 0–1.22)
MONOCYTES NFR BLD: 9 % (ref 4–12)
NEUTROPHILS # BLD MANUAL: 4.29 THOUSAND/UL (ref 1.85–7.62)
NEUTS SEG NFR BLD AUTO: 57 % (ref 43–75)
NITRITE UR QL STRIP: NEGATIVE
NON-SQ EPI CELLS URNS QL MICRO: NORMAL /HPF
PH UR STRIP.AUTO: 7 [PH]
PLATELET # BLD AUTO: 194 THOUSANDS/UL (ref 149–390)
PLATELET BLD QL SMEAR: ADEQUATE
PMV BLD AUTO: 11.4 FL (ref 8.9–12.7)
POTASSIUM SERPL-SCNC: 3.8 MMOL/L (ref 3.5–5.3)
PROT SERPL-MCNC: 8 G/DL (ref 6.4–8.4)
PROT UR STRIP-MCNC: NEGATIVE MG/DL
PSA SERPL-MCNC: 1.55 NG/ML (ref 0–4)
RBC # BLD AUTO: 5.17 MILLION/UL (ref 3.88–5.62)
RBC #/AREA URNS AUTO: NORMAL /HPF
RBC MORPH BLD: NORMAL
SODIUM SERPL-SCNC: 138 MMOL/L (ref 135–147)
SP GR UR STRIP.AUTO: 1.01 (ref 1–1.03)
T4 FREE SERPL-MCNC: 0.74 NG/DL (ref 0.61–1.12)
UROBILINOGEN UR QL STRIP.AUTO: 0.2 E.U./DL
VARIANT LYMPHS # BLD AUTO: 8 %
WBC # BLD AUTO: 7.53 THOUSAND/UL (ref 4.31–10.16)
WBC #/AREA URNS AUTO: NORMAL /HPF

## 2024-04-17 PROCEDURE — 84439 ASSAY OF FREE THYROXINE: CPT

## 2024-04-17 PROCEDURE — 85007 BL SMEAR W/DIFF WBC COUNT: CPT

## 2024-04-17 PROCEDURE — 36415 COLL VENOUS BLD VENIPUNCTURE: CPT

## 2024-04-17 PROCEDURE — 74176 CT ABD & PELVIS W/O CONTRAST: CPT

## 2024-04-17 PROCEDURE — 80053 COMPREHEN METABOLIC PANEL: CPT

## 2024-04-17 PROCEDURE — 87086 URINE CULTURE/COLONY COUNT: CPT

## 2024-04-17 PROCEDURE — 82306 VITAMIN D 25 HYDROXY: CPT

## 2024-04-17 PROCEDURE — 81001 URINALYSIS AUTO W/SCOPE: CPT

## 2024-04-17 PROCEDURE — 85027 COMPLETE CBC AUTOMATED: CPT

## 2024-04-17 PROCEDURE — G0103 PSA SCREENING: HCPCS

## 2024-04-17 PROCEDURE — 76775 US EXAM ABDO BACK WALL LIM: CPT

## 2024-04-19 LAB — BACTERIA UR CULT: NORMAL

## 2024-04-30 ENCOUNTER — APPOINTMENT (EMERGENCY)
Dept: CT IMAGING | Facility: HOSPITAL | Age: 34
End: 2024-04-30
Payer: COMMERCIAL

## 2024-04-30 ENCOUNTER — HOSPITAL ENCOUNTER (OUTPATIENT)
Facility: HOSPITAL | Age: 34
Setting detail: OBSERVATION
Discharge: HOME | End: 2024-05-02
Attending: EMERGENCY MEDICINE | Admitting: FAMILY MEDICINE
Payer: COMMERCIAL

## 2024-04-30 DIAGNOSIS — N20.0 KIDNEY STONE: ICD-10-CM

## 2024-04-30 DIAGNOSIS — R03.0 ELEVATED BLOOD PRESSURE READING: ICD-10-CM

## 2024-04-30 DIAGNOSIS — K76.0 HEPATIC STEATOSIS: ICD-10-CM

## 2024-04-30 DIAGNOSIS — N13.2 HYDRONEPHROSIS CONCURRENT WITH AND DUE TO CALCULI OF KIDNEY AND URETER: ICD-10-CM

## 2024-04-30 DIAGNOSIS — N20.1 URETEROLITHIASIS: Primary | ICD-10-CM

## 2024-04-30 DIAGNOSIS — N20.1 URETERAL CALCULUS, LEFT: ICD-10-CM

## 2024-04-30 DIAGNOSIS — E83.39 HYPOPHOSPHATEMIA: ICD-10-CM

## 2024-04-30 DIAGNOSIS — D72.829 LEUKOCYTOSIS, UNSPECIFIED TYPE: ICD-10-CM

## 2024-04-30 DIAGNOSIS — E55.9 VITAMIN D DEFICIENCY: ICD-10-CM

## 2024-04-30 DIAGNOSIS — E78.00 HYPERCHOLESTEROLEMIA: ICD-10-CM

## 2024-04-30 LAB
BACTERIA UR QL AUTO: ABNORMAL /HPF
BASOPHILS # BLD AUTO: 0.08 THOUSANDS/ÂΜL (ref 0–0.1)
BASOPHILS NFR BLD AUTO: 1 % (ref 0–1)
BILIRUB UR QL STRIP: NEGATIVE
CLARITY UR: ABNORMAL
COLOR UR: ABNORMAL
EOSINOPHIL # BLD AUTO: 0.16 THOUSAND/ÂΜL (ref 0–0.61)
EOSINOPHIL NFR BLD AUTO: 1 % (ref 0–6)
ERYTHROCYTE [DISTWIDTH] IN BLOOD BY AUTOMATED COUNT: 12.5 % (ref 11.6–15.1)
GLUCOSE UR STRIP-MCNC: NEGATIVE MG/DL
HCT VFR BLD AUTO: 43.2 % (ref 36.5–49.3)
HGB BLD-MCNC: 14.3 G/DL (ref 12–17)
HGB UR QL STRIP.AUTO: ABNORMAL
IMM GRANULOCYTES # BLD AUTO: 0.05 THOUSAND/UL (ref 0–0.2)
IMM GRANULOCYTES NFR BLD AUTO: 0 % (ref 0–2)
KETONES UR STRIP-MCNC: NEGATIVE MG/DL
LEUKOCYTE ESTERASE UR QL STRIP: NEGATIVE
LIPASE SERPL-CCNC: 18 U/L (ref 11–82)
LYMPHOCYTES # BLD AUTO: 3.71 THOUSANDS/ÂΜL (ref 0.6–4.47)
LYMPHOCYTES NFR BLD AUTO: 22 % (ref 14–44)
MCH RBC QN AUTO: 29.7 PG (ref 26.8–34.3)
MCHC RBC AUTO-ENTMCNC: 33.1 G/DL (ref 31.4–37.4)
MCV RBC AUTO: 90 FL (ref 82–98)
MONOCYTES # BLD AUTO: 1.72 THOUSAND/ÂΜL (ref 0.17–1.22)
MONOCYTES NFR BLD AUTO: 10 % (ref 4–12)
NEUTROPHILS # BLD AUTO: 11.57 THOUSANDS/ÂΜL (ref 1.85–7.62)
NEUTS SEG NFR BLD AUTO: 66 % (ref 43–75)
NITRITE UR QL STRIP: NEGATIVE
NON-SQ EPI CELLS URNS QL MICRO: ABNORMAL /HPF
NRBC BLD AUTO-RTO: 0 /100 WBCS
PH UR STRIP.AUTO: 6 [PH]
PLATELET # BLD AUTO: 235 THOUSANDS/UL (ref 149–390)
PMV BLD AUTO: 11 FL (ref 8.9–12.7)
PROT UR STRIP-MCNC: NEGATIVE MG/DL
RBC # BLD AUTO: 4.82 MILLION/UL (ref 3.88–5.62)
RBC #/AREA URNS AUTO: ABNORMAL /HPF
SP GR UR STRIP.AUTO: 1.02 (ref 1–1.03)
UROBILINOGEN UR STRIP-ACNC: <2 MG/DL
WBC # BLD AUTO: 17.29 THOUSAND/UL (ref 4.31–10.16)
WBC #/AREA URNS AUTO: ABNORMAL /HPF

## 2024-04-30 PROCEDURE — 96365 THER/PROPH/DIAG IV INF INIT: CPT

## 2024-04-30 PROCEDURE — 96375 TX/PRO/DX INJ NEW DRUG ADDON: CPT

## 2024-04-30 PROCEDURE — 99285 EMERGENCY DEPT VISIT HI MDM: CPT | Performed by: EMERGENCY MEDICINE

## 2024-04-30 PROCEDURE — 85025 COMPLETE CBC W/AUTO DIFF WBC: CPT | Performed by: EMERGENCY MEDICINE

## 2024-04-30 PROCEDURE — 83690 ASSAY OF LIPASE: CPT | Performed by: EMERGENCY MEDICINE

## 2024-04-30 PROCEDURE — 96376 TX/PRO/DX INJ SAME DRUG ADON: CPT

## 2024-04-30 PROCEDURE — 99284 EMERGENCY DEPT VISIT MOD MDM: CPT

## 2024-04-30 PROCEDURE — 81001 URINALYSIS AUTO W/SCOPE: CPT | Performed by: EMERGENCY MEDICINE

## 2024-04-30 PROCEDURE — 36415 COLL VENOUS BLD VENIPUNCTURE: CPT | Performed by: EMERGENCY MEDICINE

## 2024-04-30 PROCEDURE — 74176 CT ABD & PELVIS W/O CONTRAST: CPT

## 2024-04-30 PROCEDURE — 80053 COMPREHEN METABOLIC PANEL: CPT | Performed by: EMERGENCY MEDICINE

## 2024-04-30 RX ORDER — MORPHINE SULFATE 4 MG/ML
4 INJECTION, SOLUTION INTRAMUSCULAR; INTRAVENOUS ONCE
Status: COMPLETED | OUTPATIENT
Start: 2024-04-30 | End: 2024-04-30

## 2024-04-30 RX ORDER — TAMSULOSIN HYDROCHLORIDE 0.4 MG/1
0.4 CAPSULE ORAL ONCE
Status: COMPLETED | OUTPATIENT
Start: 2024-04-30 | End: 2024-04-30

## 2024-04-30 RX ORDER — TAMSULOSIN HYDROCHLORIDE 0.4 MG/1
0.4 CAPSULE ORAL
Status: DISCONTINUED | OUTPATIENT
Start: 2024-05-01 | End: 2024-05-02 | Stop reason: HOSPADM

## 2024-04-30 RX ORDER — KETOROLAC TROMETHAMINE 30 MG/ML
15 INJECTION, SOLUTION INTRAMUSCULAR; INTRAVENOUS ONCE
Status: COMPLETED | OUTPATIENT
Start: 2024-04-30 | End: 2024-04-30

## 2024-04-30 RX ORDER — ONDANSETRON 2 MG/ML
4 INJECTION INTRAMUSCULAR; INTRAVENOUS ONCE
Status: COMPLETED | OUTPATIENT
Start: 2024-04-30 | End: 2024-04-30

## 2024-04-30 RX ORDER — OXYCODONE HYDROCHLORIDE 5 MG/1
5 TABLET ORAL ONCE
Status: COMPLETED | OUTPATIENT
Start: 2024-04-30 | End: 2024-04-30

## 2024-04-30 RX ADMIN — SODIUM CHLORIDE, SODIUM LACTATE, POTASSIUM CHLORIDE, AND CALCIUM CHLORIDE 1000 ML: .6; .31; .03; .02 INJECTION, SOLUTION INTRAVENOUS at 20:31

## 2024-04-30 RX ADMIN — OXYCODONE HYDROCHLORIDE 5 MG: 5 TABLET ORAL at 22:47

## 2024-04-30 RX ADMIN — MORPHINE SULFATE 4 MG: 4 INJECTION, SOLUTION INTRAMUSCULAR; INTRAVENOUS at 23:36

## 2024-04-30 RX ADMIN — ONDANSETRON 4 MG: 2 INJECTION INTRAMUSCULAR; INTRAVENOUS at 20:30

## 2024-04-30 RX ADMIN — MORPHINE SULFATE 4 MG: 4 INJECTION, SOLUTION INTRAMUSCULAR; INTRAVENOUS at 21:25

## 2024-04-30 RX ADMIN — TAMSULOSIN HYDROCHLORIDE 0.4 MG: 0.4 CAPSULE ORAL at 22:47

## 2024-04-30 RX ADMIN — KETOROLAC TROMETHAMINE 15 MG: 30 INJECTION, SOLUTION INTRAMUSCULAR; INTRAVENOUS at 20:31

## 2024-04-30 NOTE — LETTER
[facility]IT  [address] [address]  Dept: [phone]    May 2, 2024     Patient: Chin Torres   YOB: 1990   Date of Visit: 4/30/2024       To Whom it May Concern:    Chin Torres is under my professional care. He was seen in the hospital from 4/30/2024 to 05/02/24. He may return to work on Monday, 5/6/24, without limitations.    If you have any questions or concerns, please don't hesitate to call.         Sincerely,          Amado Perea, DO

## 2024-05-01 ENCOUNTER — APPOINTMENT (OUTPATIENT)
Dept: ULTRASOUND IMAGING | Facility: HOSPITAL | Age: 34
End: 2024-05-01
Payer: COMMERCIAL

## 2024-05-01 PROBLEM — E55.9 VITAMIN D DEFICIENCY: Status: ACTIVE | Noted: 2024-05-01

## 2024-05-01 PROBLEM — E78.00 HYPERCHOLESTEROLEMIA: Status: ACTIVE | Noted: 2024-05-01

## 2024-05-01 PROBLEM — N20.1 URETEROLITHIASIS: Status: ACTIVE | Noted: 2024-05-01

## 2024-05-01 PROBLEM — E87.6 HYPOKALEMIA: Status: ACTIVE | Noted: 2024-05-01

## 2024-05-01 PROBLEM — N13.2 HYDRONEPHROSIS CONCURRENT WITH AND DUE TO CALCULI OF KIDNEY AND URETER: Status: ACTIVE | Noted: 2024-05-01

## 2024-05-01 PROBLEM — K76.0 HEPATIC STEATOSIS: Status: ACTIVE | Noted: 2024-05-01

## 2024-05-01 PROBLEM — E66.01 MORBID OBESITY WITH BMI OF 40.0-44.9, ADULT (HCC): Status: ACTIVE | Noted: 2024-05-01

## 2024-05-01 PROBLEM — N20.0 KIDNEY STONE: Status: ACTIVE | Noted: 2024-05-01

## 2024-05-01 PROBLEM — D72.829 LEUKOCYTOSIS: Status: ACTIVE | Noted: 2024-05-01

## 2024-05-01 LAB
ANION GAP SERPL CALCULATED.3IONS-SCNC: 9 MMOL/L (ref 4–13)
ANION GAP SERPL CALCULATED.3IONS-SCNC: 9 MMOL/L (ref 4–13)
BACTERIA UR QL AUTO: ABNORMAL /HPF
BILIRUB UR QL STRIP: NEGATIVE
BUN SERPL-MCNC: 17 MG/DL (ref 5–25)
BUN SERPL-MCNC: 17 MG/DL (ref 5–25)
CALCIUM SERPL-MCNC: 8.9 MG/DL (ref 8.4–10.2)
CALCIUM SERPL-MCNC: 9 MG/DL (ref 8.4–10.2)
CHLORIDE SERPL-SCNC: 103 MMOL/L (ref 96–108)
CHLORIDE SERPL-SCNC: 103 MMOL/L (ref 96–108)
CLARITY UR: CLEAR
CO2 SERPL-SCNC: 25 MMOL/L (ref 21–32)
CO2 SERPL-SCNC: 25 MMOL/L (ref 21–32)
COLOR UR: YELLOW
CREAT SERPL-MCNC: 1.27 MG/DL (ref 0.6–1.3)
CREAT SERPL-MCNC: 1.28 MG/DL (ref 0.6–1.3)
ERYTHROCYTE [DISTWIDTH] IN BLOOD BY AUTOMATED COUNT: 12.5 % (ref 11.6–15.1)
GFR SERPL CREATININE-BSD FRML MDRD: 73 ML/MIN/1.73SQ M
GFR SERPL CREATININE-BSD FRML MDRD: 74 ML/MIN/1.73SQ M
GLUCOSE P FAST SERPL-MCNC: 102 MG/DL (ref 65–99)
GLUCOSE P FAST SERPL-MCNC: 89 MG/DL (ref 65–99)
GLUCOSE SERPL-MCNC: 102 MG/DL (ref 65–140)
GLUCOSE SERPL-MCNC: 89 MG/DL (ref 65–140)
GLUCOSE UR STRIP-MCNC: NEGATIVE MG/DL
HCT VFR BLD AUTO: 40.6 % (ref 36.5–49.3)
HGB BLD-MCNC: 13.4 G/DL (ref 12–17)
HGB UR QL STRIP.AUTO: ABNORMAL
KETONES UR STRIP-MCNC: NEGATIVE MG/DL
LEUKOCYTE ESTERASE UR QL STRIP: NEGATIVE
MAGNESIUM SERPL-MCNC: 2 MG/DL (ref 1.9–2.7)
MCH RBC QN AUTO: 30 PG (ref 26.8–34.3)
MCHC RBC AUTO-ENTMCNC: 33 G/DL (ref 31.4–37.4)
MCV RBC AUTO: 91 FL (ref 82–98)
NITRITE UR QL STRIP: NEGATIVE
NON-SQ EPI CELLS URNS QL MICRO: ABNORMAL /HPF
PH UR STRIP.AUTO: 6 [PH]
PLATELET # BLD AUTO: 219 THOUSANDS/UL (ref 149–390)
PMV BLD AUTO: 11.4 FL (ref 8.9–12.7)
POTASSIUM SERPL-SCNC: 3.8 MMOL/L (ref 3.5–5.3)
POTASSIUM SERPL-SCNC: 4.1 MMOL/L (ref 3.5–5.3)
PROT UR STRIP-MCNC: NEGATIVE MG/DL
RBC # BLD AUTO: 4.46 MILLION/UL (ref 3.88–5.62)
RBC #/AREA URNS AUTO: ABNORMAL /HPF
SODIUM SERPL-SCNC: 137 MMOL/L (ref 135–147)
SODIUM SERPL-SCNC: 137 MMOL/L (ref 135–147)
SP GR UR STRIP.AUTO: >=1.03 (ref 1–1.03)
UROBILINOGEN UR STRIP-ACNC: <2 MG/DL
WBC # BLD AUTO: 10.83 THOUSAND/UL (ref 4.31–10.16)
WBC #/AREA URNS AUTO: ABNORMAL /HPF

## 2024-05-01 PROCEDURE — 99204 OFFICE O/P NEW MOD 45 MIN: CPT | Performed by: UROLOGY

## 2024-05-01 PROCEDURE — 85027 COMPLETE CBC AUTOMATED: CPT | Performed by: FAMILY MEDICINE

## 2024-05-01 PROCEDURE — 99221 1ST HOSP IP/OBS SF/LOW 40: CPT | Performed by: FAMILY MEDICINE

## 2024-05-01 PROCEDURE — 87086 URINE CULTURE/COLONY COUNT: CPT | Performed by: INTERNAL MEDICINE

## 2024-05-01 PROCEDURE — 76775 US EXAM ABDO BACK WALL LIM: CPT

## 2024-05-01 PROCEDURE — 36415 COLL VENOUS BLD VENIPUNCTURE: CPT | Performed by: FAMILY MEDICINE

## 2024-05-01 PROCEDURE — 83735 ASSAY OF MAGNESIUM: CPT | Performed by: FAMILY MEDICINE

## 2024-05-01 PROCEDURE — 81001 URINALYSIS AUTO W/SCOPE: CPT | Performed by: INTERNAL MEDICINE

## 2024-05-01 PROCEDURE — 80048 BASIC METABOLIC PNL TOTAL CA: CPT

## 2024-05-01 PROCEDURE — 80048 BASIC METABOLIC PNL TOTAL CA: CPT | Performed by: FAMILY MEDICINE

## 2024-05-01 PROCEDURE — NC001 PR NO CHARGE

## 2024-05-01 RX ORDER — ACETAMINOPHEN 325 MG/1
650 TABLET ORAL EVERY 6 HOURS PRN
Status: DISCONTINUED | OUTPATIENT
Start: 2024-05-01 | End: 2024-05-02 | Stop reason: HOSPADM

## 2024-05-01 RX ORDER — HEPARIN SODIUM 5000 [USP'U]/ML
5000 INJECTION, SOLUTION INTRAVENOUS; SUBCUTANEOUS EVERY 8 HOURS SCHEDULED
Status: DISCONTINUED | OUTPATIENT
Start: 2024-05-01 | End: 2024-05-02 | Stop reason: HOSPADM

## 2024-05-01 RX ORDER — KETOROLAC TROMETHAMINE 30 MG/ML
15 INJECTION, SOLUTION INTRAMUSCULAR; INTRAVENOUS EVERY 6 HOURS SCHEDULED
Qty: 4 ML | Refills: 0 | Status: DISCONTINUED | OUTPATIENT
Start: 2024-05-01 | End: 2024-05-02 | Stop reason: HOSPADM

## 2024-05-01 RX ORDER — SODIUM CHLORIDE 9 MG/ML
150 INJECTION, SOLUTION INTRAVENOUS CONTINUOUS
Status: DISCONTINUED | OUTPATIENT
Start: 2024-05-01 | End: 2024-05-02

## 2024-05-01 RX ORDER — MORPHINE SULFATE 4 MG/ML
4 INJECTION, SOLUTION INTRAMUSCULAR; INTRAVENOUS EVERY 4 HOURS PRN
Status: DISCONTINUED | OUTPATIENT
Start: 2024-05-01 | End: 2024-05-02 | Stop reason: HOSPADM

## 2024-05-01 RX ORDER — POTASSIUM CHLORIDE 14.9 MG/ML
20 INJECTION INTRAVENOUS ONCE
Status: COMPLETED | OUTPATIENT
Start: 2024-05-01 | End: 2024-05-01

## 2024-05-01 RX ORDER — CEFTRIAXONE 1 G/50ML
1000 INJECTION, SOLUTION INTRAVENOUS EVERY 24 HOURS
Qty: 150 ML | Refills: 0 | Status: DISCONTINUED | OUTPATIENT
Start: 2024-05-01 | End: 2024-05-02 | Stop reason: HOSPADM

## 2024-05-01 RX ADMIN — POTASSIUM CHLORIDE 20 MEQ: 14.9 INJECTION, SOLUTION INTRAVENOUS at 02:04

## 2024-05-01 RX ADMIN — SODIUM CHLORIDE 150 ML/HR: 0.9 INJECTION, SOLUTION INTRAVENOUS at 20:33

## 2024-05-01 RX ADMIN — KETOROLAC TROMETHAMINE 15 MG: 30 INJECTION, SOLUTION INTRAMUSCULAR; INTRAVENOUS at 09:09

## 2024-05-01 RX ADMIN — CEFTRIAXONE 1000 MG: 1 INJECTION, SOLUTION INTRAVENOUS at 16:04

## 2024-05-01 RX ADMIN — KETOROLAC TROMETHAMINE 15 MG: 30 INJECTION, SOLUTION INTRAMUSCULAR; INTRAVENOUS at 14:38

## 2024-05-01 RX ADMIN — KETOROLAC TROMETHAMINE 15 MG: 30 INJECTION, SOLUTION INTRAMUSCULAR; INTRAVENOUS at 01:58

## 2024-05-01 RX ADMIN — SODIUM CHLORIDE, SODIUM LACTATE, POTASSIUM CHLORIDE, AND CALCIUM CHLORIDE 1000 ML: .6; .31; .03; .02 INJECTION, SOLUTION INTRAVENOUS at 00:11

## 2024-05-01 RX ADMIN — SODIUM CHLORIDE 150 ML/HR: 0.9 INJECTION, SOLUTION INTRAVENOUS at 14:38

## 2024-05-01 RX ADMIN — KETOROLAC TROMETHAMINE 15 MG: 30 INJECTION, SOLUTION INTRAMUSCULAR; INTRAVENOUS at 20:22

## 2024-05-01 RX ADMIN — TAMSULOSIN HYDROCHLORIDE 0.4 MG: 0.4 CAPSULE ORAL at 15:57

## 2024-05-01 RX ADMIN — SODIUM CHLORIDE 150 ML/HR: 0.9 INJECTION, SOLUTION INTRAVENOUS at 02:00

## 2024-05-01 NOTE — ASSESSMENT & PLAN NOTE
Restart his previous statin therapy of atorvastatin 20 mg PO Qdaily  Lifestyle modifications are recommended including weight loss, improving his diet, and increasing his amount of physica activity/exercise.  Outpatient follow-up with PCP in regards to this matter     Latest Reference Range & Units 05/02/24 06:26   Cholesterol See Comment mg/dL 198   Triglycerides See Comment mg/dL 189 (H)   HDL >=40 mg/dL 34 (L)   Non-HDL Cholesterol mg/dl 164   LDL Calculated 0 - 100 mg/dL 126 (H)   (H): Data is abnormally high  (L): Data is abnormally low

## 2024-05-01 NOTE — ASSESSMENT & PLAN NOTE
Checked a HgbA1c level on 05/20/2024, which is pending at the time of discharge  Outpatient Gastroenterology evaluation  Outpatient surveillance imaging and monitoring of LFT's (liver function tests) with his PCP

## 2024-05-01 NOTE — ASSESSMENT & PLAN NOTE
"I appreciate Urology's input.  Treated with ceftriaxone 1000 mg IV every 24 hours, Toradol 15 mg IV every 6 hours, and NSS IV fluids at 150 ml/hr during the hospitalization  Initiated on Flomax 0.4 mg PO Qdaily  The patient was going to be sent to [facility] via the \"[employer] [facility]\" for a urologic intervention on 05/02/024.  Fortunately, the patient passed the ureteral stone on his own, so the urologic intervention was cancelled.  The patient will need to follow-up with Urology as an outpatient and will need a repeat ultrasound of the kidneys and bladder in 1-2 weeks to ensure the hydronephrosis has resolved.  "

## 2024-05-01 NOTE — H&P
[facility]  H&P  Name: Chin Torres 34 y.o. male I MRN: [id number]  Unit/Bed#: RM03 I Date of Admission: 4/30/2024   Date of Service: 5/1/2024 I Hospital Day: 0      Assessment/Plan   Kidney stone  Assessment & Plan  3 mm distal left ureteral calculus with mild hydronephrosis and mild perirenal stranding.  Patient with pain that he cannot tolerate.  Start Toradol 15 mg IV every 6 hours standing  Morphine for moderate and severe pain  IV fluids 150 cc/h  Urology consultation  N.p.o.    Hypokalemia  Assessment & Plan  Potassium 3.4  Will replace with IV potassium 20 mEq.    Leukocytosis  Assessment & Plan  Reactive, noninfectious.  Will repeat in the morning    Morbid obesity with BMI of 40.0-44.9, adult (HCC)  Assessment & Plan  BMI of 41.35             VTE Prophylaxis: Heparin  / sequential compression device   Code Status: Level 1 - Full Code       Anticipated Length of Stay:  Patient will be admitted on an Observation basis with an anticipated length of stay of less than 2 midnights.   Justification for Hospital Stay: Please see detailed plans noted above.    Chief Complaint:     Left flank pain  History of Present Illness:  Chin Torres is a 34 y.o. male who has past medical history significant for obesity, comes in for left flank pain.  Diagnosed with a 3 mm stone on April 17, 2024 CT scan and since was having symptoms about 5 hours ago.  The pain is acutely worsened.  He is having increased urinary frequency and decreased urine output.  No hematuria no fevers or chills.  He took some over-the-counter pain relief.      Review of Systems:    Constitutional:  Denies fever or chills   Eyes:  Denies change in visual acuity   HENT:  Denies nasal congestion or sore throat   Respiratory:  Denies cough or shortness of breath   Cardiovascular:  Denies chest pain or edema   GI:  Denies abdominal pain or bloody stools  : Left flank pain, increased urinary frequency  Musculoskeletal:  Denies  back pain or joint pain   Integument:  Denies rash   Neurologic:  Denies headache or sensory changes   Endocrine:  Denies polyuria or polydipsia   Lymphatic:  Denies swollen glands   Psychiatric:  Denies depression or anxiety     Past Medical and Surgical History:   Past Medical History:   Diagnosis Date    Anxiety     Asthma     Depression      Past Surgical History:   Procedure Laterality Date    TONSILLECTOMY         Meds/Allergies:  No current facility-administered medications on file prior to encounter.     Current Outpatient Medications on File Prior to Encounter   Medication Sig Dispense Refill    albuterol (Ventolin HFA) 90 mcg/act inhaler Inhale 2 puffs every 6 (six) hours as needed for wheezing (Patient not taking: Reported on 10/19/2023) 18 g 0    ALPRAZolam (XANAX) 0.5 mg tablet Take 1 mg by mouth 3 (three) times a day (Patient not taking: Reported on 9/13/2023)      atorvastatin (LIPITOR) 20 mg tablet  (Patient not taking: Reported on 9/13/2023)      FLUoxetine (PROzac) 40 MG capsule  (Patient not taking: Reported on 9/13/2023)      methylPREDNISolone 4 MG tablet therapy pack Use as directed on package 21 each 0    triamcinolone (KENALOG) 0.1 % cream Apply topically 2 (two) times a day for 7 days 15 g 1           Allergies: No Known Allergies    History:  Marital Status: Single     Substance Use History:   Social History     Substance and Sexual Activity   Alcohol Use Yes    Comment: rare     Social History     Tobacco Use   Smoking Status Never   Smokeless Tobacco Not on file     Social History     Substance and Sexual Activity   Drug Use No       Family History:  History reviewed. No pertinent family history.    Physical Exam:     Vitals:   Blood Pressure: 143/79 (05/01/24 0015)  Pulse: 55 (05/01/24 0015)  Temperature: 98.2 °F (36.8 °C) (04/30/24 2013)  Temp Source: Temporal (04/30/24 2013)  Respirations: 19 (05/01/24 0011)  Weight - Scale: 120 kg (264 lb) (04/30/24 2013)  SpO2: 95 % (05/01/24  0015)    Constitutional:  Non-toxic appearance  Eyes:  EOMI, No scleral icterus   HENT:   oropharynx moist, external ears normal, external nose normal   Respiratory:  No respiratory distress, no wheezing   Cardiovascular:  Normal rate, no murmurs   GI:  Soft, nondistended, no guarding   :  No costovertebral angle tenderness   Musculoskeletal:  no tenderness, no deformities. Back- no tenderness  Integument:  no jaundice, no rash   Neurologic:  Alert &awake, communicative, CN 2-12 normal,  no focal deficits noted   Psychiatric:  Speech and behavior appropriate       Lab Results: I have personally reviewed pertinent reports.   and I have personally reviewed pertinent films in PACS    Results from last 7 days   Lab Units 04/30/24 2032   WBC Thousand/uL 17.29*   HEMOGLOBIN g/dL 14.3   HEMATOCRIT % 43.2   PLATELETS Thousands/uL 235   SEGS PCT % 66   LYMPHO PCT % 22   MONO PCT % 10   EOS PCT % 1     Results from last 7 days   Lab Units 04/30/24 2032   POTASSIUM mmol/L 3.4*   CHLORIDE mmol/L 100   CO2 mmol/L 24   BUN mg/dL 17   CREATININE mg/dL 1.17   CALCIUM mg/dL 9.5   ALK PHOS U/L 45   ALT U/L 35   AST U/L 22             Imaging: I have personally reviewed pertinent reports.   and I have personally reviewed pertinent films in PACS    CT renal stone study abdomen pelvis without contrast    Result Date: 4/30/2024  Narrative: CT ABDOMEN AND PELVIS WITHOUT IV CONTRAST - LOW DOSE RENAL STONE INDICATION: left flank pain hx of distal ureteral stone. COMPARISON: CT scan from 4/17/2024. TECHNIQUE: Low radiation dose thin section CT examination of the abdomen and pelvis was performed without intravenous or oral contrast according to a protocol specifically designed to evaluate for urinary tract calculus. Axial, sagittal, and coronal 2D reformatted images were created from the source data and submitted for interpretation. Evaluation for pathology in the abdomen and pelvis that is unrelated to urinary tract calculi is limited.  Radiation dose length product (DLP) for this visit: 1018.81 mGy-cm . This examination, like all CT scans performed in the [facility], was performed utilizing techniques to minimize radiation dose exposure, including the use of iterative reconstruction and automated exposure control. URINARY TRACT FINDINGS: RIGHT KIDNEY AND URETER: No urinary tract calculi. No hydronephrosis or hydroureter. Stable right renal cyst. LEFT KIDNEY AND URETER: Stable 3 mm distal left ureteral calculus slightly more inferior toward the bladder image 2/149 with mild hydroureteronephrosis and mild periureteral stranding. URINARY BLADDER: Unremarkable. ADDITIONAL FINDINGS: LOWER CHEST: No clinically significant abnormality in the visualized lower chest. SOLID VISCERA: Hepatic steatosis. Otherwise, limited low radiation dose CT demonstrates no clinically significant abnormality of visualized solid abdominal viscera. GALLBLADDER/BILIARY TREE: No calcified gallstones. No pericholecystic inflammatory change. No biliary dilation. STOMACH AND BOWEL: Unremarkable. APPENDIX: Normal. ABDOMINOPELVIC CAVITY: No ascites. No pneumoperitoneum. No lymphadenopathy. VESSELS: Unremarkable for patient's age. REPRODUCTIVE ORGANS: Unremarkable for patient's age. ABDOMINAL WALL/INGUINAL REGIONS: Unremarkable. BONES: No acute fracture or suspicious osseous lesion.     Impression: Stable 3 mm distal left ureteral calculus slightly more inferior toward the bladder image 2/149 with mild hydroureteronephrosis and mild periureteral stranding. Fatty liver. Workstation performed: [id number]     US kidney and bladder    Result Date: 4/23/2024  Narrative: RENAL ULTRASOUND INDICATION: R10.9: Unspecified abdominal pain. Left-sided abdominal pain. Hematuria. COMPARISON: Kidney stone CT was subsequently performed following this examination. TECHNIQUE: Ultrasound of the retroperitoneum was performed with a curvilinear transducer utilizing volumetric sweeps and  still imaging techniques. FINDINGS: KIDNEYS: Symmetric and normal size. Right kidney: 10.4 x 6.8 x 8.0 cm. Volume 296.8 mL Left kidney: 12.1 x 7.6 x 6.9 cm. Volume 328.1 mL Right kidney Normal echogenicity and contour. No solid mass. There is a simple cyst, measuring 4.1 x 4.2 x 3.4 cm. No hydronephrosis. No shadowing calculi. No perinephric fluid collections. Left kidney Normal echogenicity and contour. No mass is identified. No hydronephrosis. No shadowing calculi. No perinephric fluid collections. URETERS: Nonvisualized. BLADDER: Normally distended. No focal thickening or mass lesions. Bilateral ureteral jets detected.     Impression: Simple right renal cyst measuring 4.2 cm, otherwise unremarkable ultrasound appearance of the kidneys. No hydronephrosis. Bilateral ureteral jets were seen. *Note, subsequent CT study did demonstrate a 0.3 cm calculus in the distal left ureter. The examination demonstrates a significant  finding and was documented as such in [facility] for liaison and referring practitioner notification. Workstation performed: [id number]     CT renal stone study abdomen pelvis wo contrast    Result Date: 4/17/2024  Narrative: CT ABDOMEN AND PELVIS WITHOUT IV CONTRAST - LOW DOSE RENAL STONE INDICATION: R31.9: Hematuria, unspecified. COMPARISON: None. TECHNIQUE: Low radiation dose thin section CT examination of the abdomen and pelvis was performed without intravenous or oral contrast according to a protocol specifically designed to evaluate for urinary tract calculus. Axial, sagittal, and coronal 2D reformatted images were created from the source data and submitted for interpretation. Evaluation for pathology in the abdomen and pelvis that is unrelated to urinary tract calculi is limited. Radiation dose length product (DLP) for this visit: 1057.71 mGy-cm . This examination, like all CT [facility], was performed utilizing techniques to minimize radiation dose exposure,  including the use of iterative reconstruction and automated exposure control. URINARY TRACT FINDINGS: RIGHT KIDNEY AND URETER: No urinary tract calculi. No hydronephrosis or hydroureter. Renal cyst. LEFT KIDNEY AND URETER: 3 mm calculus in the distal left ureter. No renal calculi. No hydronephrosis or hydroureter. URINARY BLADDER: Unremarkable. ADDITIONAL FINDINGS: LOWER CHEST: No clinically significant abnormality in the visualized lower chest. SOLID VISCERA: Hepatic steatosis. Otherwise, limited low radiation dose CT demonstrates no clinically significant abnormality of visualized solid abdominal viscera. GALLBLADDER/BILIARY TREE: No calcified gallstones. No pericholecystic inflammatory change. No biliary dilation. STOMACH AND BOWEL: No disproportionate dilation of the small or large bowel. APPENDIX: Normal. ABDOMINOPELVIC CAVITY: No ascites. No pneumoperitoneum. No lymphadenopathy. VESSELS: Unremarkable for patient's age. REPRODUCTIVE ORGANS: Unremarkable for patient's age. ABDOMINAL WALL/INGUINAL REGIONS: Unremarkable. BONES: No acute fracture or suspicious osseous lesion.     Impression: 3 mm calculus in the distal left ureter. No hydroureteronephrosis. Hepatic steatosis. Workstation performed: [id number]     MDM: Low  Patient requires hospitalization due to kidney stone pain  Reviewed CBC, CMP, CT scan  Prescription drug therapy with IV Toradol and IV fluids  Consult urology  CBC BMP mag in the morning    Epic Records Reviewed as well as Records in Care Everywhere    ** Please Note: Dragon 360 Dictation voice to text software was used in the creation of this document. **

## 2024-05-01 NOTE — ED NOTES
Pt rang bell stated the pain is getting severe again provider made aware      Basilio Meyer Jr., RN  04/30/24 [id number]

## 2024-05-01 NOTE — ASSESSMENT & PLAN NOTE
3 mm distal left ureteral calculus with mild hydronephrosis and mild perirenal stranding.  Patient with pain that he cannot tolerate.  Start Toradol 15 mg IV every 6 hours standing  Morphine for moderate and severe pain  IV fluids 150 cc/h  Urology consultation  N.p.o.

## 2024-05-01 NOTE — ASSESSMENT & PLAN NOTE
Checked blood cultures x 2 sets and a urine culture on 05/02/2024, which are all pending at the time of discharge  Treated with ceftriaxone 1000 mg IV every 24 hours during the hospitalization  The leukocytosis resolved.  Follow the CBC after discharge with his PCP

## 2024-05-01 NOTE — CONSULTS
Consultation - UROLOGY  Chin Torres 34 y.o. male MRN: [id number]  Unit/Bed#: RM11 Encounter: [id number]    Assessment/Plan     Assessment:    34-year-old male seen to the ED here last evening with cramping intermittent left-sided flank pain which began yesterday afternoon.  He admitted 10 out of 10 pain initially.  No previous history of kidney stones.  No blood.  He admitted urinary frequency.  He has been having similar symptoms in the past for about a month and saw his PCP, Dr. Preet Stockton, who ordered diagnostic imaging on an outpatient basis..  He had a CT scan abdomen pelvis on April 17th where ultrasound then did not show any shadowing calculi or hydro nephrosis noted.  Simple right renal assessment of region 4.2 cm. Subsequent CT scan abdomen pelvis on April 17th showed a 0.3 cm calculus in the distal left ureter.  He had not been started on tamsulosin or seen by urology as an outpatient at this point before seeking evaluation here in the ED last evening.  Prior to this current episode he denies previous history of kidney stone disease.  Repeat CT scan without contrast yesterday showed a stable 3 mm left distal ureteral calculus slightly more inferior towards the bladder with mild hydroureteronephrosis and mild periureteral stranding.    CBC with a white count 10.83.  Hemoglobin 13.4  BMP with normal electrolytes.  Creatinine 1.27, GFR 74.  Urinalysis showed large amount of occult blood, negative leukocytes.    Vital signs reviewed, hemodynamically stable and afebrile.    Plan:    ED physician had discussed via [employer] messaging with urology [facility] on-call overnight, Dorene Canela.  She recommended IV fluids and medical stone expulsive therapy.    Patient has been started on tamsulosin twice daily.  IV fluids.  Strain all urine.  Analgesics as ordered  Right now the patient is feeling better, he relates his pain as a 3/10.  Will give him 24-hour trial of medical stone expulsive therapy.  Will advance clear  liquids throughout the remainder of the day, then n.p.o. after midnight.  Monitor kidney function, repeat a.m. labs including CBC and BMP.  Will reassess in the morning.  If his symptoms worsen then he would likely need tertiary transfer to urology for cystoscopy and left ureteroscopy, laser lithotripsy and stent insertion.  Obtain kidney ultrasound today to evaluate left hydroureteronephrosis.    Remainder of medical management per the direction of the attending hospitalist provider.    History of Present Illness     HPI:  Chin Torres is a 34 y.o. male who presents with worsening left flank pain and urinary frequency which she has had on and off with intermittent discomfort now for about a month.  He had seen his primary care physician in the office who had ordered CT scan and subsequent renal ultrasound on April 17.  At that time he was noted to have a 3 mm distal left ureteral stone.  He now presents with urinary frequency, denies hematuria.  No fever or chills.  Some mild nausea.  He had been given narcotic pain medication and started on tamsulosin in the ED.  IV fluids initiated.  Trial medical expulsive therapy at this time for 24 hours.  Reevaluate kidney function in the morning.  At this time no plan for any tertiary transfer for urological procedures as hopeful that he will spontaneously passed the stone.    Inpatient consult to Urology  Consult performed by: Meño Shepherd PA-C  Consult ordered by: Bhupendra Nicole MD        Review of Systems   Constitutional:  Negative for activity change, chills, fatigue and fever.   HENT: Negative.     Eyes: Negative.    Respiratory:  Negative for cough, shortness of breath, wheezing and stridor.    Cardiovascular:  Negative for chest pain, palpitations and leg swelling.   Gastrointestinal:  Positive for nausea. Negative for abdominal pain, blood in stool, constipation and vomiting.   Genitourinary:  Positive for flank pain and frequency. Negative for decreased  urine volume, difficulty urinating and dysuria.        Intermittent cramping left flank pain with urinary frequency.  No blood noted.  Denies previous history of kidney stones.   Musculoskeletal:  Negative for back pain, neck pain and neck stiffness.   Skin: Negative.    Neurological:  Negative for dizziness, weakness, light-headedness and headaches.   Hematological: Negative.    Psychiatric/Behavioral: Negative.     All other systems reviewed and are negative.      Historical Information   Past Medical History:   Diagnosis Date    Anxiety     Asthma     Depression      Past Surgical History:   Procedure Laterality Date    TONSILLECTOMY       Social History   Social History     Substance and Sexual Activity   Alcohol Use Yes    Comment: rare     Social History     Substance and Sexual Activity   Drug Use No     E-Cigarette/Vaping    E-Cigarette Use Never User      E-Cigarette/Vaping Substances     Social History     Tobacco Use   Smoking Status Never   Smokeless Tobacco Not on file     Family History: History reviewed. No pertinent family history.    Meds/Allergies   current meds:   Current Facility-Administered Medications   Medication Dose Route Frequency    acetaminophen (TYLENOL) tablet 650 mg  650 mg Oral Q6H PRN    heparin (porcine) subcutaneous injection 5,000 Units  5,000 Units Subcutaneous Q8H MURPHY    ketorolac (TORADOL) injection 15 mg  15 mg Intravenous Q6H MURPHY    morphine injection 2 mg  2 mg Intravenous Q4H PRN    morphine injection 4 mg  4 mg Intravenous Q4H PRN    sodium chloride 0.9 % infusion  150 mL/hr Intravenous Continuous    tamsulosin (FLOMAX) capsule 0.4 mg  0.4 mg Oral Daily With Dinner     No Known Allergies    Objective   First Vitals:   Blood Pressure: (!) 189/118 (04/30/24 2013)  Pulse: 91 (04/30/24 2013)  Temperature: 98.2 °F (36.8 °C) (04/30/24 2013)  Temp Source: Temporal (04/30/24 2013)  Respirations: 20 (04/30/24 2013)  Weight - Scale: 120 kg (264 lb) (04/30/24 2013)  SpO2: 97 %  (04/30/24 2013)    Current Vitals:   Blood Pressure: 143/79 (05/01/24 0015)  Pulse: 55 (05/01/24 0015)  Temperature: 98.2 °F (36.8 °C) (04/30/24 2013)  Temp Source: Temporal (04/30/24 2013)  Respirations: 19 (05/01/24 0011)  Weight - Scale: 120 kg (264 lb) (04/30/24 2013)  SpO2: 95 % (05/01/24 0015)      Intake/Output Summary (Last 24 hours) at 5/1/2024 0943  Last data filed at 5/1/2024 0055  Gross per 24 hour   Intake 1000 ml   Output --   Net 1000 ml       Invasive Devices       Peripheral Intravenous Line  Duration             Peripheral IV 04/30/24 Left Antecubital <1 day    Peripheral IV 05/01/24 Dorsal (posterior);Right Hand <1 day                    Physical Exam  Constitutional:       General: He is not in acute distress.     Appearance: He is not ill-appearing or toxic-appearing.   HENT:      Head: Normocephalic.      Nose: Nose normal.      Mouth/Throat:      Mouth: Mucous membranes are moist.      Pharynx: Oropharynx is clear.   Eyes:      General: No scleral icterus.     Pupils: Pupils are equal, round, and reactive to light.   Cardiovascular:      Rate and Rhythm: Regular rhythm.      Heart sounds: Normal heart sounds. No murmur heard.  Pulmonary:      Breath sounds: Normal breath sounds. No wheezing or rales.   Abdominal:      General: Bowel sounds are normal. There is no distension.      Palpations: There is no mass.      Tenderness: There is no abdominal tenderness. There is left CVA tenderness. There is no right CVA tenderness, guarding or rebound.      Hernia: No hernia is present.   Genitourinary:     Penis: Normal.       Testes: Normal.   Musculoskeletal:      Cervical back: Normal range of motion. No tenderness.      Right lower leg: No edema.      Left lower leg: No edema.   Lymphadenopathy:      Cervical: No cervical adenopathy.   Skin:     Capillary Refill: Capillary refill takes less than 2 seconds.      Coloration: Skin is not jaundiced or pale.      Findings: No erythema or rash.  "  Neurological:      General: No focal deficit present.      Mental Status: He is alert and oriented to person, place, and time. Mental status is at baseline.   Psychiatric:         Mood and Affect: Mood normal.         Thought Content: Thought content normal.         Judgment: Judgment normal.       Lab Results: I have personally reviewed pertinent lab results.  , CBC:   Lab Results   Component Value Date    WBC 10.83 (H) 05/01/2024    HGB 13.4 05/01/2024    HCT 40.6 05/01/2024    MCV 91 05/01/2024     05/01/2024    RBC 4.46 05/01/2024    MCH 30.0 05/01/2024    MCHC 33.0 05/01/2024    RDW 12.5 05/01/2024    MPV 11.4 05/01/2024    NRBC 0 04/30/2024   , CMP:   Lab Results   Component Value Date    SODIUM 137 05/01/2024    K 4.1 05/01/2024     05/01/2024    CO2 25 05/01/2024    BUN 17 05/01/2024    CREATININE 1.27 05/01/2024    CALCIUM 8.9 05/01/2024    AST 22 04/30/2024    ALT 35 04/30/2024    ALKPHOS 45 04/30/2024    EGFR 74 05/01/2024   , Coagulation: No results found for: \"PT\", \"INR\", \"APTT\", Urinalysis:   Lab Results   Component Value Date    COLORU Light Yellow 04/30/2024    CLARITYU Other 04/30/2024    SPECGRAV 1.020 04/30/2024    PHUR 6.0 04/30/2024    LEUKOCYTESUR Negative 04/30/2024    NITRITE Negative 04/30/2024    GLUCOSEU Negative 04/30/2024    KETONESU Negative 04/30/2024    BILIRUBINUR Negative 04/30/2024    BLOODU Large (A) 04/30/2024     Imaging: I have personally reviewed pertinent films in PACS    CT ABDOMEN AND PELVIS WITHOUT IV CONTRAST - LOW DOSE RENAL STONE - 4/30/2024     INDICATION: left flank pain hx of distal ureteral stone.     COMPARISON: CT scan from 4/17/2024.     TECHNIQUE: Low radiation dose thin section CT examination of the abdomen and pelvis was performed without intravenous or oral contrast according to a protocol specifically designed to evaluate for urinary tract calculus. Axial, sagittal, and coronal 2D   reformatted images were created from the source data and " submitted for interpretation. Evaluation for pathology in the abdomen and pelvis that is unrelated to urinary tract calculi is limited.     Radiation dose length product (DLP) for this visit: 1018.81 mGy-cm . This examination, like all CT scans performed in the [facility], was performed utilizing techniques to minimize radiation dose exposure, including the use of iterative   reconstruction and automated exposure control.     URINARY TRACT FINDINGS:     RIGHT KIDNEY AND URETER: No urinary tract calculi. No hydronephrosis or hydroureter. Stable right renal cyst.     LEFT KIDNEY AND URETER: Stable 3 mm distal left ureteral calculus slightly more inferior toward the bladder image 2/149 with mild hydroureteronephrosis and mild periureteral stranding.     URINARY BLADDER: Unremarkable.        ADDITIONAL FINDINGS:     LOWER CHEST: No clinically significant abnormality in the visualized lower chest.     SOLID VISCERA: Hepatic steatosis. Otherwise, limited low radiation dose CT demonstrates no clinically significant abnormality of visualized solid abdominal viscera.     GALLBLADDER/BILIARY TREE: No calcified gallstones. No pericholecystic inflammatory change. No biliary dilation.     STOMACH AND BOWEL: Unremarkable.     APPENDIX: Normal.     ABDOMINOPELVIC CAVITY: No ascites. No pneumoperitoneum. No lymphadenopathy.     VESSELS: Unremarkable for patient's age.     REPRODUCTIVE ORGANS: Unremarkable for patient's age.     ABDOMINAL WALL/INGUINAL REGIONS: Unremarkable.     BONES: No acute fracture or suspicious osseous lesion.     IMPRESSION:     Stable 3 mm distal left ureteral calculus slightly more inferior toward the bladder image 2/149 with mild hydroureteronephrosis and mild periureteral stranding.     Fatty liver.     EKG, Pathology, and Other Studies: I have personally reviewed pertinent reports.      Counseling / Coordination of Care  Total floor / unit time spent today 45 minutes.  Greater than 50%  of total time was spent with the patient and / or family counseling and / or coordination of care.  A description of the counseling / coordination of care: Review of diagnostic imaging laboratory studies, personal examination of the patient was conducted, review existing orders and current management all performed by this provider in the urological evaluation of the patient at this time.      Meño Shepherd PA-C

## 2024-05-01 NOTE — ED NOTES
Pt aware of need for urine specimen. Unable to void at this time     Nidia Duggan RN  05/01/24 3952

## 2024-05-01 NOTE — APP STUDENT NOTE
[facility] STUDENT  Inpatient Progress Note for TRAINING ONLY  Not Part of Legal Medical Record       H&P Exam - Chin Torres 34 y.o. male MRN: [id number]    Unit/Bed#: RM11 Encounter: [id number]    Assessment:  Kidney stone  Hx of kidney stones, passed one around 4/6  Began having left sided flank pain again around 4/16, PCP ordered CT renal stone study and lab work  Found 3 mm calculus in distal left ureter, no hydroureteronephrosis  Pain subsided until 4/30 around 1500, severe left sided flank pain with increased urinary frequency and decreased urine output. Reported to ED  Repeat CT renal stone study in ED confirmed 3 mm calculus, slightly more inferior with mild hydroureteronephrosis    Plan:  Flomax 0.4 mg PO once daily  IV  mL/hour  Pain control with Toradol 15 mg IV q6 hours and morphine PRN for severe pain  Monitor urine output, strain urine  Holding off on surgical intervention for now    History of Present Illness   Chin is a 33 y/o Male with PMH of kidney stones, asthma, anxiety, and depression who presented to the ED on 4/30 with severe left sided flank pain, urinary frequency and decreased urine output. Patient had first episode of kidney stones around 4/6 with similar symptoms. He passed the stone at home with increased oral fluids and Tylenol for pain relief. He again starting having left sided flank pain around 4/16, in which he went to his PCP for evaluation. PCP ordered CT renal study and lab work which confirmed another kidney stone, 3 mm in size in the distal left ureter with no hydroureteronephrosis. He was instructed to increase oral fluids and strain his urine with Tylenol for pain relief. His pain subsided after a few days but he did not believe he passed the stone. His pain returned on 4/30 around 1500 at work after using the restroom and decided to report to the ED as his pain was intolerable. Workup in the ED again confirmed the kidney stone, slightly more inferior but still in the  distal left ureter. He was put on aggressive IV fluids, pain control, and Flomax. This morning, patient states his pain has mostly subsided with just some mild discomfort in the left flank with changing positions in the bed. Denies N/V/D, abdominal pain, genital pain, fevers, or chills. Reports increased urinary frequency and urine output. He states he does not believe he passed the stone yet.     ROS: History obtained from chart review and the patient  General ROS: negative for - chills, fever, or malaise  Respiratory ROS: negative for - shortness of breath, tachypnea, or wheezing  Cardiovascular ROS: negative for - chest pain, dyspnea on exertion, irregular heartbeat, palpitations, rapid heart rate, or shortness of breath  Gastrointestinal ROS: negative for - abdominal pain, change in stools, constipation, diarrhea, nausea/vomiting, CVA tenderness, or flank pain   Genito-Urinary ROS: negative for - change in urinary stream, dysuria, hematuria, pelvic pain, or scrotal mass/pain. Positive for urinary frequency and increased urine output  Neurological ROS: negative for - bowel and bladder control changes, dizziness, headaches, numbness/tingling, or weakness    Historical Information   Past Medical History:   Diagnosis Date    Anxiety     Asthma     Depression      Past Surgical History:   Procedure Laterality Date    TONSILLECTOMY       Social History   Social History     Substance and Sexual Activity   Alcohol Use Yes    Comment: rare     Social History     Substance and Sexual Activity   Drug Use No     Social History     Tobacco Use   Smoking Status Never   Smokeless Tobacco Not on file     Family History: History reviewed. No pertinent family history.    Meds/Allergies   PTA meds:   Prior to Admission Medications   Prescriptions Last Dose Informant Patient Reported? Taking?   ALPRAZolam (XANAX) 0.5 mg tablet   Yes No   Sig: Take 1 mg by mouth 3 (three) times a day   Patient not taking: Reported on 9/13/2023    FLUoxetine (PROzac) 40 MG capsule   Yes No   Patient not taking: Reported on 9/13/2023   albuterol (Ventolin HFA) 90 mcg/act inhaler   No No   Sig: Inhale 2 puffs every 6 (six) hours as needed for wheezing   Patient not taking: Reported on 10/19/2023   atorvastatin (LIPITOR) 20 mg tablet   Yes No   Patient not taking: Reported on 9/13/2023   methylPREDNISolone 4 MG tablet therapy pack   No No   Sig: Use as directed on package   triamcinolone (KENALOG) 0.1 % cream   No No   Sig: Apply topically 2 (two) times a day for 7 days      Facility-Administered Medications: None     No Known Allergies    Objective   First Vitals:   Blood Pressure: (!) 189/118 (04/30/24 2013)  Pulse: 91 (04/30/24 2013)  Temperature: 98.2 °F (36.8 °C) (04/30/24 2013)  Temp Source: Temporal (04/30/24 2013)  Respirations: 20 (04/30/24 2013)  Weight - Scale: 120 kg (264 lb) (04/30/24 2013)  SpO2: 97 % (04/30/24 2013)    Current Vitals:   Blood Pressure: 143/79 (05/01/24 0015)  Pulse: 55 (05/01/24 0015)  Temperature: 98.2 °F (36.8 °C) (04/30/24 2013)  Temp Source: Temporal (04/30/24 2013)  Respirations: 19 (05/01/24 0011)  Weight - Scale: 120 kg (264 lb) (04/30/24 2013)  SpO2: 95 % (05/01/24 0015)      Intake/Output Summary (Last 24 hours) at 5/1/2024 0802  Last data filed at 5/1/2024 0055  Gross per 24 hour   Intake 1000 ml   Output --   Net 1000 ml       Invasive Devices       Peripheral Intravenous Line  Duration             Peripheral IV 04/30/24 Left Antecubital <1 day    Peripheral IV 05/01/24 Dorsal (posterior);Right Hand <1 day                    Physical Exam:     General Appearance: Patient sitting comfortably in bed and cooperative. He is not toxic-appearing and in no acute distress.     HENT: Normocephalic and atraumatic. PERRLA. Nasal mucosa moist with no edema or discharge. Oral mucosa moist with no edema or erythema.     Cardiovascular: Heart RRR. No murmurs or gallops. Normal S1 and S2.     Respiratory: Lungs CTA without wheezing,  "rhonchi or rales. No tachypnea or respiratory distress.     Abdominal: Rounded abdomen with no distension, masses, or hernias. Normoactive bowel sounds in all four quadrants. Mild discomfort to palpation of left lower quadrant. Nontender to light and deep palpation of upper quadrants and right lower quadrant. Tympany to percussion. No CVA tenderness bilaterally.     Extremities: No gross deformities, injury, cyanosis, or nail clubbing. Skin warm and dry. +2 radial and dorsalis pedis pulses.     Neuro: Alert and oriented x 3. Normal mentation and thought content. Sensory and motor function intact. Cranial nerves grossly intact. Did not observe ambulation.     Lab Results:    White count: 17.29 (4/30), 10.83 (5/1)  Potassium: 3.4 (4/30)   Lipase WNL  UA: occult blood detected  Mag WNL  Imaging:    CT renal stone study 4/17: \"3 mm calculus in the distal left ureter. No hydroureteronephrosis.\"  CT renal stone study 4/30: \"Stable 3 mm distal left ureteral calculus slightly more inferior toward the bladder image 2/149 with mild hydroureteronephrosis and mild periureteral stranding.\"   EKG, Pathology, and Other Studies: n/a    Code Status: Level 1 - Full Code  Advance Directive and Living Will:      Power of :    POLST:      Counseling / Coordination of Care:   Total floor / unit time spent today 45 minutes.      "

## 2024-05-01 NOTE — ASSESSMENT & PLAN NOTE
Initiated cholecalciferol 2000 I.U. PO Qdaily  Outpatient follow-up with PCP in regards to this matter         Latest Reference Range & Units 04/17/24 07:14   VITAMIN D, 25-HYDROXY 30.0 - 100.0 ng/mL 10.6 (L)   (L): Data is abnormally low

## 2024-05-01 NOTE — ED PROVIDER NOTES
History  Chief Complaint   Patient presents with    Flank Pain     Left sided flank pain started about 5 hrs ago pt stated he drank 5 bottles of water and has not been able to urinate 10/10 pain      34-year-old male with history of asthma depression anxiety and kidney stones presents with left flank pain.  Diagnosed with a 3 mm left distal ureteral stone on 4/17/2024 on CT scan he been having symptoms since beginning of the month he does not believe he has passed that stone he had 1 prior episode of kidney stones which he was able to pass spontaneously.  Patient is not currently on Flomax.  He states that he acutely had worsening pain several hours prior to arrival which is in the left flank rating down to low back.  Been accompanied by increased urinary frequency and decreased urine output no hematuria no fever or chills he has been diaphoretic nauseated but no vomiting has been able to tolerate 3-4 bottles of water he took some Tylenol prior to arrival.  There is no history of trauma or falls no testicular pain no chest pain shortness of breath or lightheadedness.  Not yet seen urology for his nephrolithiasis  Past surgical history tonsillectomy        Prior to Admission Medications   Prescriptions Last Dose Informant Patient Reported? Taking?   ALPRAZolam (XANAX) 0.5 mg tablet   Yes No   Sig: Take 1 mg by mouth 3 (three) times a day   Patient not taking: Reported on 9/13/2023   FLUoxetine (PROzac) 40 MG capsule   Yes No   Patient not taking: Reported on 9/13/2023   albuterol (Ventolin HFA) 90 mcg/act inhaler   No No   Sig: Inhale 2 puffs every 6 (six) hours as needed for wheezing   Patient not taking: Reported on 10/19/2023   atorvastatin (LIPITOR) 20 mg tablet   Yes No   Patient not taking: Reported on 9/13/2023   methylPREDNISolone 4 MG tablet therapy pack   No No   Sig: Use as directed on package   triamcinolone (KENALOG) 0.1 % cream   No No   Sig: Apply topically 2 (two) times a day for 7 days       Facility-Administered Medications: None       Past Medical History:   Diagnosis Date    Anxiety     Asthma     Depression        Past Surgical History:   Procedure Laterality Date    TONSILLECTOMY         History reviewed. No pertinent family history.  I have reviewed and agree with the history as documented.    E-Cigarette/Vaping    E-Cigarette Use Never User      E-Cigarette/Vaping Substances     Social History     Tobacco Use    Smoking status: Never   Vaping Use    Vaping status: Never Used   Substance Use Topics    Alcohol use: Yes     Comment: rare    Drug use: No       Review of Systems   Constitutional:  Positive for diaphoresis. Negative for activity change, appetite change, chills and fever.   HENT:  Negative for congestion, ear pain, rhinorrhea, sneezing and sore throat.    Eyes:  Negative for discharge.   Respiratory:  Negative for cough and shortness of breath.    Cardiovascular:  Negative for chest pain and leg swelling.   Gastrointestinal:  Positive for nausea. Negative for abdominal pain, blood in stool, diarrhea and vomiting.   Endocrine: Negative for polyuria.   Genitourinary:  Positive for decreased urine volume, difficulty urinating, flank pain (left) and frequency. Negative for dysuria, hematuria, scrotal swelling, testicular pain and urgency.   Musculoskeletal:  Positive for back pain. Negative for myalgias.   Skin:  Negative for rash.   Neurological:  Negative for dizziness, weakness, light-headedness and numbness.   Hematological:  Negative for adenopathy.   Psychiatric/Behavioral:  Negative for confusion.    All other systems reviewed and are negative.      Physical Exam  Physical Exam  Vitals and nursing note reviewed.   Constitutional:       General: He is in acute distress.      Appearance: He is well-developed. He is not ill-appearing, toxic-appearing or diaphoretic.   HENT:      Head: Normocephalic and atraumatic.      Right Ear: External ear normal.      Left Ear: External ear  normal.      Nose: Nose normal.      Mouth/Throat:      Mouth: Mucous membranes are moist.      Pharynx: No oropharyngeal exudate or posterior oropharyngeal erythema.   Eyes:      General: No scleral icterus.        Right eye: No discharge.         Left eye: No discharge.      Extraocular Movements: Extraocular movements intact.      Conjunctiva/sclera: Conjunctivae normal.      Pupils: Pupils are equal, round, and reactive to light.   Cardiovascular:      Rate and Rhythm: Normal rate and regular rhythm.      Heart sounds: Normal heart sounds.   Pulmonary:      Effort: Pulmonary effort is normal. No respiratory distress.      Breath sounds: Normal breath sounds. No wheezing, rhonchi or rales.   Abdominal:      General: Bowel sounds are normal. There is no distension.      Palpations: Abdomen is soft. There is no mass.      Tenderness: There is no abdominal tenderness. There is no right CVA tenderness, left CVA tenderness, guarding or rebound.      Comments: Back: no mildline or CVA tenderness   Musculoskeletal:         General: No tenderness or deformity. Normal range of motion.      Cervical back: Normal range of motion and neck supple.      Right lower leg: No edema.      Left lower leg: No edema.   Lymphadenopathy:      Cervical: No cervical adenopathy.   Skin:     General: Skin is warm and dry.      Capillary Refill: Capillary refill takes less than 2 seconds.   Neurological:      Mental Status: He is alert and oriented to person, place, and time.      Cranial Nerves: No cranial nerve deficit.      Sensory: No sensory deficit.      Motor: No weakness or abnormal muscle tone.      Coordination: Coordination normal.      Deep Tendon Reflexes: Reflexes normal.      Comments: Gait steady   Psychiatric:         Mood and Affect: Mood normal.         Vital Signs  ED Triage Vitals   Temperature Pulse Respirations Blood Pressure SpO2   04/30/24 2013 04/30/24 2013 04/30/24 2013 04/30/24 2013 04/30/24 2013   98.2 °F (36.8  °C) 91 20 (!) 189/118 97 %      Temp Source Heart Rate Source Patient Position - Orthostatic VS BP Location FiO2 (%)   04/30/24 2013 05/01/24 0011 04/30/24 2013 04/30/24 2013 --   Temporal Monitor Lying Right arm       Pain Score       04/30/24 2013       9           Vitals:    04/30/24 2013 04/30/24 2130 05/01/24 0011 05/01/24 0015   BP: (!) 189/118  143/79 143/79   Pulse: 91 78 72 55   Patient Position - Orthostatic VS: Lying  Sitting          Visual Acuity      ED Medications  Medications   tamsulosin (FLOMAX) capsule 0.4 mg (has no administration in time range)   sodium chloride 0.9 % infusion (150 mL/hr Intravenous New Bag 5/1/24 0200)   acetaminophen (TYLENOL) tablet 650 mg (has no administration in time range)   heparin (porcine) subcutaneous injection 5,000 Units (5,000 Units Subcutaneous Not Given 5/1/24 0142)   ketorolac (TORADOL) injection 15 mg (0 mg Intravenous Hold 5/1/24 0205)   morphine injection 2 mg (has no administration in time range)   morphine injection 4 mg (has no administration in time range)   potassium chloride 20 mEq IVPB (premix) (20 mEq Intravenous New Bag 5/1/24 0204)   ondansetron (ZOFRAN) injection 4 mg (4 mg Intravenous Given 4/30/24 2030)   ketorolac (TORADOL) injection 15 mg (15 mg Intravenous Given 4/30/24 2031)   lactated ringers bolus 1,000 mL (0 mL Intravenous Stopped 4/30/24 2158)   morphine injection 4 mg (4 mg Intravenous Given 4/30/24 2125)   tamsulosin (FLOMAX) capsule 0.4 mg (0.4 mg Oral Given 4/30/24 2247)   oxyCODONE (ROXICODONE) IR tablet 5 mg (5 mg Oral Given 4/30/24 2247)   morphine injection 4 mg (4 mg Intravenous Given 4/30/24 2336)   lactated ringers bolus 1,000 mL (0 mL Intravenous Stopped 5/1/24 0055)       Diagnostic Studies  Results Reviewed       Procedure Component Value Units Date/Time    Urine culture [[id number]] Collected: 04/30/24 2148    Lab Status: In process Specimen: Urine Updated: 04/30/24 8848    Urine Microscopic [[id number]]  (Abnormal)  Collected: 04/30/24 2148    Lab Status: Final result Specimen: Urine, Clean Catch Updated: 04/30/24 2209     RBC, UA 30-50 /hpf      WBC, UA 0-1 /hpf      Epithelial Cells None Seen /hpf      Bacteria, UA None Seen /hpf     UA w Reflex to Microscopic w Reflex to Culture [[id number]]  (Abnormal) Collected: 04/30/24 2148    Lab Status: Final result Specimen: Urine, Clean Catch Updated: 04/30/24 2158     Color, UA Light Yellow     Clarity, UA Other     Specific Gravity, UA 1.020     pH, UA 6.0     Leukocytes, UA Negative     Nitrite, UA Negative     Protein, UA Negative mg/dl      Glucose, UA Negative mg/dl      Ketones, UA Negative mg/dl      Urobilinogen, UA <2.0 mg/dl      Bilirubin, UA Negative     Occult Blood, UA Large    Comprehensive metabolic panel [[id number]]  (Abnormal) Collected: 04/30/24 2032    Lab Status: Final result Specimen: Blood from Arm, Left Updated: 04/30/24 2100     Sodium 136 mmol/L      Potassium 3.4 mmol/L      Chloride 100 mmol/L      CO2 24 mmol/L      ANION GAP 12 mmol/L      BUN 17 mg/dL      Creatinine 1.17 mg/dL      Glucose 92 mg/dL      Calcium 9.5 mg/dL      AST 22 U/L      ALT 35 U/L      Alkaline Phosphatase 45 U/L      Total Protein 7.8 g/dL      Albumin 4.5 g/dL      Total Bilirubin 0.66 mg/dL      eGFR 82 ml/min/1.73sq m     Narrative:      National Kid[employer] Foundation guidelines for Chronic Kidney Disease (CKD):     Stage 1 with normal or high GFR (GFR > 90 mL/min/1.73 square meters)    Stage 2 Mild CKD (GFR = 60-89 mL/min/1.73 square meters)    Stage 3A Moderate CKD (GFR = 45-59 mL/min/1.73 square meters)    Stage 3B Moderate CKD (GFR = 30-44 mL/min/1.73 square meters)    Stage 4 Severe CKD (GFR = 15-29 mL/min/1.73 square meters)    Stage 5 End Stage CKD (GFR <15 mL/min/1.73 square meters)  Note: GFR calculation is accurate only with a steady state creatinine    Lipase [[id number]]  (Normal) Collected: 04/30/24 2032    Lab Status: Final result Specimen: Blood from Arm,  Left Updated: 04/30/24 2100     Lipase 18 u/L     CBC and differential [[id number]]  (Abnormal) Collected: 04/30/24 2032    Lab Status: Final result Specimen: Blood from Arm, Left Updated: 04/30/24 2042     WBC 17.29 Thousand/uL      RBC 4.82 Million/uL      Hemoglobin 14.3 g/dL      Hematocrit 43.2 %      MCV 90 fL      MCH 29.7 pg      MCHC 33.1 g/dL      RDW 12.5 %      MPV 11.0 fL      Platelets 235 Thousands/uL      nRBC 0 /100 WBCs      Segmented % 66 %      Immature Grans % 0 %      Lymphocytes % 22 %      Monocytes % 10 %      Eosinophils Relative 1 %      Basophils Relative 1 %      Absolute Neutrophils 11.57 Thousands/µL      Absolute Immature Grans 0.05 Thousand/uL      Absolute Lymphocytes 3.71 Thousands/µL      Absolute Monocytes 1.72 Thousand/µL      Eosinophils Absolute 0.16 Thousand/µL      Basophils Absolute 0.08 Thousands/µL                    CT renal stone study abdomen pelvis without contrast   Final Result by Irving Gutierrez MD (04/30 2141)      Stable 3 mm distal left ureteral calculus slightly more inferior toward the bladder image 2/149 with mild hydroureteronephrosis and mild periureteral stranding.      Fatty liver.         Workstation performed: [id number]                    Procedures  Procedures         ED Course  ED Course as of 05/01/24 0228 Tue Apr 30, 2024 2230 Reviewed CT findings of distal ureteral stone with evidence of hydroureter and some laboratory changes it is and has not moved much since prior study   2231 Patient was given an additional 4 mg of morphine his pain is returning.  Reviewed disposition will depend on pain management will administer Flomax 0.4 mg p.o. as well as oxycodone 5 mg p.o. as this would be his outpatient regimen to see if his pain is tolerable and he can make an informed choice of whether outpatient management is feasible.   2323 Patient pacing the room uncomfortable will administer additional morphine and contact urology to discuss shelbi gaona    4692 TC to Dorene Canela   2727 Dorene Canela recommends additional IVF hospitalize at [facility] urine and uroolgy will re-assess in AM                               SBIRT 22yo+      Flowsheet Row Most Recent Value   Initial Alcohol Screen: US AUDIT-C     1. How often do you have a drink containing alcohol? 0 Filed at: 04/30/2024 2014   2. How many drinks containing alcohol do you have on a typical day you are drinking?  0 Filed at: 04/30/2024 2014   3a. Male UNDER 65: How often do you have five or more drinks on one occasion? 0 Filed at: 04/30/2024 2014   3b. FEMALE Any Age, or MALE 65+: How often do you have 4 or more drinks on one occassion? 0 Filed at: 04/30/2024 2014   Audit-C Score 0 Filed at: 04/30/2024 2014   NORY: How many times in the past year have you...    Used an illegal drug or used a prescription medication for non-medical reasons? Never Filed at: 04/30/2024 2014                      Medical Decision Making  Mdm: 34-year-old male with previous history of nephrolithiasis been able to process 1 prior stone spontaneously being evaluated for over 1 month for this current episode.  Recommend he undergo repeat CT in manage to assess for possibility of hydronephrosis obstruction will check for acute kidney injury leukocytosis or any evidence of UTI.  Initiate symptomatic management with Toradol and Zofran.    Amount and/or Complexity of Data Reviewed  Labs: ordered.  Radiology: ordered.    Risk  Prescription drug management.  Decision regarding hospitalization.             Disposition  Final diagnoses:   Ureterolithiasis - symptomatic; Stable 3 mm distal left ureteral calculus slightly more inferior toward the bladder image 2/149 with mild hydroureteronephrosis and mild periureteral stranding.   Elevated blood pressure reading     Time reflects when diagnosis was documented in both MDM as applicable and the Disposition within this note       Time User Action Codes Description Comment    4/30/2024  10:31 PM Nichol Morejon Add [N20.1] Ureterolithiasis     4/30/2024 10:31 PM Nichol Morejon Modify [N20.1] Ureterolithiasis Stable 3 mm distal left ureteral calculus slightly more inferior toward the bladder image 2/149 with mild hydroureteronephrosis and mild periureteral stranding.    4/30/2024 11:55 PM Nichol Morejon Modify [N20.1] Ureterolithiasis symptomatic; Stable 3 mm distal left ureteral calculus slightly more inferior toward the bladder image 2/149 with mild hydroureteronephrosis and mild periureteral stranding.    5/1/2024 12:07 AM Nichol Morejon [R03.0] Elevated blood pressure reading           ED Disposition       ED Disposition   Admit    Condition   Stable    Date/Time   Tue Apr 30, 2024 2741    Comment   Case was discussed with Dr. Nicole and the patient's admission status was agreed to be Admission Status: observation status to the service of Dr. Nicole .               Follow-up Information    None         Patient's Medications   Discharge Prescriptions    No medications on file       No discharge procedures on file.    PDMP Review       None            ED Provider  Electronically Signed by             Nichol Morejon MD  05/01/24 0228

## 2024-05-01 NOTE — ASSESSMENT & PLAN NOTE
Resolved with potassium chloride supplementation treatment  Follow the potassium level and magnesium level after discharge with his PCP

## 2024-05-02 ENCOUNTER — TELEPHONE (OUTPATIENT)
Dept: UROLOGY | Facility: CLINIC | Age: 34
End: 2024-05-02

## 2024-05-02 ENCOUNTER — APPOINTMENT (OUTPATIENT)
Dept: RADIOLOGY | Facility: HOSPITAL | Age: 34
End: 2024-05-02
Payer: COMMERCIAL

## 2024-05-02 VITALS
OXYGEN SATURATION: 97 % | RESPIRATION RATE: 15 BRPM | TEMPERATURE: 98.2 F | HEIGHT: 67 IN | HEART RATE: 88 BPM | WEIGHT: 264.99 LBS | BODY MASS INDEX: 41.59 KG/M2 | SYSTOLIC BLOOD PRESSURE: 149 MMHG | DIASTOLIC BLOOD PRESSURE: 94 MMHG

## 2024-05-02 DIAGNOSIS — N20.0 KIDNEY STONE: Primary | ICD-10-CM

## 2024-05-02 PROBLEM — E78.6 LOW HDL (UNDER 40): Status: ACTIVE | Noted: 2024-05-02

## 2024-05-02 PROBLEM — E83.39 HYPOPHOSPHATEMIA: Status: ACTIVE | Noted: 2024-05-02

## 2024-05-02 LAB
BACTERIA UR CULT: NORMAL
BASOPHILS # BLD AUTO: 0.04 THOUSANDS/ÂΜL (ref 0–0.1)
BASOPHILS NFR BLD AUTO: 1 % (ref 0–1)
CHOLEST SERPL-MCNC: 198 MG/DL
EOSINOPHIL # BLD AUTO: 0.11 THOUSAND/ÂΜL (ref 0–0.61)
EOSINOPHIL NFR BLD AUTO: 1 % (ref 0–6)
ERYTHROCYTE [DISTWIDTH] IN BLOOD BY AUTOMATED COUNT: 12.8 % (ref 11.6–15.1)
EST. AVERAGE GLUCOSE BLD GHB EST-MCNC: 131 MG/DL
HAV IGM SER QL: NORMAL
HBA1C MFR BLD: 6.2 %
HBV CORE IGM SER QL: NORMAL
HBV SURFACE AG SER QL: NORMAL
HCT VFR BLD AUTO: 41.6 % (ref 36.5–49.3)
HCV AB SER QL: NORMAL
HDLC SERPL-MCNC: 34 MG/DL
HGB BLD-MCNC: 13.6 G/DL (ref 12–17)
IMM GRANULOCYTES # BLD AUTO: 0.01 THOUSAND/UL (ref 0–0.2)
IMM GRANULOCYTES NFR BLD AUTO: 0 % (ref 0–2)
INR PPP: 0.98 (ref 0.84–1.19)
LDLC SERPL CALC-MCNC: 126 MG/DL (ref 0–100)
LYMPHOCYTES # BLD AUTO: 2.31 THOUSANDS/ÂΜL (ref 0.6–4.47)
LYMPHOCYTES NFR BLD AUTO: 28 % (ref 14–44)
MAGNESIUM SERPL-MCNC: 2.2 MG/DL (ref 1.9–2.7)
MCH RBC QN AUTO: 29.5 PG (ref 26.8–34.3)
MCHC RBC AUTO-ENTMCNC: 32.7 G/DL (ref 31.4–37.4)
MCV RBC AUTO: 90 FL (ref 82–98)
MONOCYTES # BLD AUTO: 0.77 THOUSAND/ÂΜL (ref 0.17–1.22)
MONOCYTES NFR BLD AUTO: 9 % (ref 4–12)
NEUTROPHILS # BLD AUTO: 4.93 THOUSANDS/ÂΜL (ref 1.85–7.62)
NEUTS SEG NFR BLD AUTO: 61 % (ref 43–75)
NONHDLC SERPL-MCNC: 164 MG/DL
NRBC BLD AUTO-RTO: 0 /100 WBCS
PHOSPHATE SERPL-MCNC: 2.4 MG/DL (ref 2.7–4.5)
PLATELET # BLD AUTO: 209 THOUSANDS/UL (ref 149–390)
PMV BLD AUTO: 11 FL (ref 8.9–12.7)
PROCALCITONIN SERPL-MCNC: 0.07 NG/ML
PROTHROMBIN TIME: 12.9 SECONDS (ref 11.6–14.5)
RBC # BLD AUTO: 4.61 MILLION/UL (ref 3.88–5.62)
TRIGL SERPL-MCNC: 189 MG/DL
WBC # BLD AUTO: 8.17 THOUSAND/UL (ref 4.31–10.16)

## 2024-05-02 PROCEDURE — 99232 SBSQ HOSP IP/OBS MODERATE 35: CPT | Performed by: UROLOGY

## 2024-05-02 PROCEDURE — NC001 PR NO CHARGE

## 2024-05-02 PROCEDURE — 80061 LIPID PANEL: CPT | Performed by: INTERNAL MEDICINE

## 2024-05-02 PROCEDURE — 80053 COMPREHEN METABOLIC PANEL: CPT | Performed by: INTERNAL MEDICINE

## 2024-05-02 PROCEDURE — 85610 PROTHROMBIN TIME: CPT | Performed by: INTERNAL MEDICINE

## 2024-05-02 PROCEDURE — 80074 ACUTE HEPATITIS PANEL: CPT | Performed by: INTERNAL MEDICINE

## 2024-05-02 PROCEDURE — 85025 COMPLETE CBC W/AUTO DIFF WBC: CPT

## 2024-05-02 PROCEDURE — 84145 PROCALCITONIN (PCT): CPT | Performed by: INTERNAL MEDICINE

## 2024-05-02 PROCEDURE — 84100 ASSAY OF PHOSPHORUS: CPT | Performed by: INTERNAL MEDICINE

## 2024-05-02 PROCEDURE — 83735 ASSAY OF MAGNESIUM: CPT | Performed by: INTERNAL MEDICINE

## 2024-05-02 PROCEDURE — 83036 HEMOGLOBIN GLYCOSYLATED A1C: CPT | Performed by: INTERNAL MEDICINE

## 2024-05-02 PROCEDURE — 87040 BLOOD CULTURE FOR BACTERIA: CPT | Performed by: INTERNAL MEDICINE

## 2024-05-02 PROCEDURE — 99239 HOSP IP/OBS DSCHRG MGMT >30: CPT | Performed by: INTERNAL MEDICINE

## 2024-05-02 RX ORDER — ATORVASTATIN CALCIUM 20 MG/1
20 TABLET, FILM COATED ORAL
Qty: 30 TABLET | Refills: 0 | Status: SHIPPED | OUTPATIENT
Start: 2024-05-02

## 2024-05-02 RX ORDER — ACETAMINOPHEN 325 MG/1
650 TABLET ORAL EVERY 6 HOURS PRN
Start: 2024-05-02

## 2024-05-02 RX ORDER — TAMSULOSIN HYDROCHLORIDE 0.4 MG/1
0.4 CAPSULE ORAL
Qty: 30 CAPSULE | Refills: 0 | Status: SHIPPED | OUTPATIENT
Start: 2024-05-02

## 2024-05-02 RX ADMIN — Medication 2000 UNITS: at 08:40

## 2024-05-02 RX ADMIN — SODIUM CHLORIDE 150 ML/HR: 0.9 INJECTION, SOLUTION INTRAVENOUS at 02:48

## 2024-05-02 RX ADMIN — KETOROLAC TROMETHAMINE 15 MG: 30 INJECTION, SOLUTION INTRAMUSCULAR; INTRAVENOUS at 08:40

## 2024-05-02 RX ADMIN — Medication 2 TABLET: at 08:40

## 2024-05-02 NOTE — PROGRESS NOTES
Progress Note - General Surgery   Chin Torres 34 y.o. male MRN: [id number]  Unit/Bed#: 405-01 Encounter: [id number]    Assessment:  34-year-old healthy male presented 2 days ago to the ED with severe left flank pain and urinary frequency.  CT scan showed a distal 3 mm left ureteral stone.  Renal ultrasound yesterday confirmed distal ureteral stone and mild hydroureteronephrosis.    Early this morning he awoke from sleep after midnight, felt a burning and went to urinate in the bathroom and passed small pieces of gravel or sediment.  Right now he is entirely free of any pain.  No nausea or vomiting.  The patient had been kept n.p.o. anticipating possible boomerang transfer for cystoscopy left ureteral stent today in bathroom.    WBC at this morningwas normal at 8, 17 in the ED.  Patient had been on IV ceftriaxone.  Creatinine 0.9    Vital signs stable, afebrile.    Plan:  Discussed in person with Dr. Amado Perea, attending hospitalist physician.  Personal Tiger text telephone conversation conducted with Dr. Licona from urology.  At this time we will cancel surgery and ambulance transport today.  The patient is stable and pain-free.    Dr. Licona will task his office to schedule follow-up appointment in the urology office as well as follow-up imaging in a couple weeks to include KUB and renal ultrasound with PVR.    Discontinue IV fluids  Regular diet today.  His surgery has been canceled.  Medicine can discharge later today.    Continue the patient on Flomax 0.4 mg at bedtime    Remainder of medical management per the attending hospitalist service.    Subjective/Objective   Chief Complaint: Patient awoke overnight, burning sensation briefly and then passed small gravel.  Right now he has no pain.    Subjective: Overnight events noted.  He likely passed a stone.  Denies any flank pain or urinary frequency at present.  No hematuria.  He just voided in the bathroom, no further sediment.  Small flecks of  "what likely was some sediment noted in the strainer.  The patient has not had any fever or chills.  Had been kept n.p.o. anticipating urological boomerang transfer for cystoscopy and left ureteral stent today.  Discussed with Dr. Licona and at this time his procedure will be canceled.    Scheduled Meds:  Current Facility-Administered Medications   Medication Dose Route Frequency Provider Last Rate    acetaminophen  650 mg Oral Q6H PRN Bhupendra Nicole MD      cefTRIAXone  1,000 mg Intravenous Q24H Amaod Perea DO 1,000 mg (05/01/24 1604)    cholecalciferol  2,000 Units Oral Daily Amado Perea DO      heparin (porcine)  5,000 Units Subcutaneous Q8H [facility] Bhupendra Nicole MD      ketorolac  15 mg Intravenous Q6H [facility] Amado Perea,       morphine injection  2 mg Intravenous Q4H PRN Bhupendra Nicole MD      morphine injection  4 mg Intravenous Q4H PRN Bhupendra Nicole MD      potassium-sodium phosphateS  2 tablet Oral Once Amado Perea DO      tamsulosin  0.4 mg Oral Daily With Dinner JULY Villeda       Continuous Infusions:   PRN Meds:.  acetaminophen    morphine injection    morphine injection        Objective:     Blood pressure 157/80, pulse 95, temperature 98 °F (36.7 °C), temperature source Oral, resp. rate 20, height 5' 7\" (1.702 m), weight 120 kg (264 lb 15.9 oz), SpO2 96%.,Body mass index is 41.5 kg/m².      Intake/Output Summary (Last 24 hours) at 5/2/2024 0756  Last data filed at 5/2/2024 0749  Gross per 24 hour   Intake 2500 ml   Output 875 ml   Net 1625 ml       Invasive Devices       Peripheral Intravenous Line  Duration             Peripheral IV 04/30/24 Left Antecubital 1 day                    Physical Exam:     Awake, ambulatory to the bathroom.  Appears in no distress.  Pleasant demeanor.  Head normocephalic.  ENT clear.  Neck supple.  Chest symmetric.  Heart RRR.  Lungs CTA.  Abdomen positive bowel sounds, soft, nontender " throughout.  Back he does not have any flank tenderness to percussion on the left side.  Extremities no calf tenderness or peripheral edema.  Normal gait.  Neurological mental status normal.  Fully oriented.    Lab, Imaging and other studies:I have personally reviewed pertinent lab results.  , CBC:   Lab Results   Component Value Date    WBC 8.17 05/02/2024    HGB 13.6 05/02/2024    HCT 41.6 05/02/2024    MCV 90 05/02/2024     05/02/2024    RBC 4.61 05/02/2024    MCH 29.5 05/02/2024    MCHC 32.7 05/02/2024    RDW 12.8 05/02/2024    MPV 11.0 05/02/2024    NRBC 0 05/02/2024   , CMP:   Lab Results   Component Value Date    SODIUM 139 05/02/2024    K 3.7 05/02/2024     05/02/2024    CO2 26 05/02/2024    BUN 12 05/02/2024    CREATININE 0.90 05/02/2024    CALCIUM 8.9 05/02/2024    AST 18 05/02/2024    ALT 26 05/02/2024    ALKPHOS 44 05/02/2024    EGFR 112 05/02/2024     VTE Pharmacologic Prophylaxis: Heparin  VTE Mechanical Prophylaxis: sequential compression device    Meño Shepherd PA-C

## 2024-05-02 NOTE — CASE MANAGEMENT
Case Management Discharge Planning Note    Patient name Chin Torres  Location /405-01 MRN [id number]  : 1990 Date 2024       Current Admission Date: 2024  Current Admission Diagnosis:Ureterolithiasis   Patient Active Problem List    Diagnosis Date Noted    Hypophosphatemia 2024    Kidney stone 2024    Hypokalemia 2024    Leukocytosis 2024    Hepatic steatosis 2024    Ureterolithiasis 2024    Hydronephrosis concurrent with and due to calculi of kidney and ureter 2024    Hypercholesterolemia 2024    Vitamin D deficiency 2024      LOS (days): 0  Geometric Mean LOS (GMLOS) (days):   Days to GMLOS:     OBJECTIVE:            Current admission status: Observation   Preferred Pharmacy:   [id number] [address]  Phone: [phone] Fax: [phone]    Primary Care Provider: Preet Stockton MD    Primary Insurance: [facility]  Secondary Insurance:     DISCHARGE DETAILS:  Chart review done. No CM needs identified at this time. Plans are home on dc with OP follow up. Will continue to follow in the event any dc needs arise.

## 2024-05-02 NOTE — APP STUDENT NOTE
"[facility] STUDENT  Inpatient Progress Note for TRAINING ONLY  Not Part of Legal Medical Record       Progress Note - Chin Torres 34 y.o. male MRN: [id number]    Unit/Bed#: 405-01 Encounter: [id number]      Assessment:  Ureterolithiasis  Patient reports passing stone overnight, gravel and sediment found in strainer  Prior plan to transfer for surgical intervention has been canceled  Patient reports no more abdominal pain, CVA tenderness, nausea, vomiting, or dysuria    Plan:  Patient cleared to discharge from urology perspective  Flomax 0.4 mg nightly for the next month at home  Follow-up with urology outpatient within the next month  Order KUB and renal ultrasound outpatient to be performed before f/u visit  Patient can call urology office if he has any reproduction of symptoms  D/c IV fluids  Regular diet    Subjective:   Patient seen and examined this morning, sitting in hospital couch in street clothes. Appears happy and eating breakfast. Patient confirms report of passing kidney stone last night. He is not experiencing any abdominal pain, flank pain, N/V/D, dysuria, urinary hesitancy, urinary frequency, fevers or chills. He is looking forward to being discharged. Patient informed of plan to follow-up with urology, acquire imaging studies, and to take Flomax for the next month. He is in agreement with the described plan.     Objective:     Vitals: Blood pressure 149/94, pulse 88, temperature 98.2 °F (36.8 °C), temperature source Oral, resp. rate 15, height 5' 7\" (1.702 m), weight 120 kg (264 lb 15.9 oz), SpO2 97%.,Body mass index is 41.5 kg/m².      Intake/Output Summary (Last 24 hours) at 5/2/2024 0954  Last data filed at 5/2/2024 0749  Gross per 24 hour   Intake 2500 ml   Output 875 ml   Net 1625 ml       Physical Exam:     General Appearance: Patient is pleasantly seated in the hospital couch in street clothes eating breakfast. He is cooperative and in good spirits. He is not ill-appearing or in acute distress. "     HENT: Normocephalic and atraumatic. Nasal mucosa moist and without erythema or discharge. Oral mucosa moist and without erythema or edema.     Cardiovascular: Heart RRR without murmurs or gallops. Normal S1 and S2.     Respiratory: CTA bilaterally without wheezing, rhonchi or rales. Not in acute respiratory distress.     Abdominal: Rounded abdomen without distension, masses, hernias, or skin changes. Normoactive bowel sounds throughout. Nontender to light and deep palpation in all four quadrants. No CVA tenderness to percussion.     Extremities: No gross abnormalities, skin changes, edema, or cyanosis. +2 radial and posterior tibial pulses bilaterally. Normal gait observed.     Neuro: Alert and oriented x 3. Normal mentation and thought content. Sensory and motor function intact. Cranial nerves grossly intact. Speech is articulate and clear.     Invasive Devices       None                   Lab, Imaging and other studies: I have personally reviewed pertinent reports.    Fasting glucose 105  Phosphorus 2.4  Triglycerides 189    HDL 34   VTE Pharmacologic Prophylaxis: Heparin  VTE Mechanical Prophylaxis: sequential compression device

## 2024-05-02 NOTE — DISCHARGE SUMMARY
"[facility]  Discharge- Chin Torres 1990, 34 y.o. male MRN: [id number]  Unit/Bed#: 405-01 Encounter: [id number]  Primary Care Provider: Preet Stockton MD   Date and time admitted to hospital: 4/30/2024  8:10 PM    * Ureterolithiasis  Assessment & Plan  I appreciate Urology's input.  Treated with ceftriaxone 1000 mg IV every 24 hours, Toradol 15 mg IV every 6 hours, and NSS IV fluids at 150 ml/hr during the hospitalization  Initiated on Flomax 0.4 mg PO Qdaily  The patient was going to be sent to [facility] via the [facility] for a urologic intervention on 05/02/024.  Fortunately, the patient passed the ureteral stone on his own, so the urologic intervention was cancelled.  The patient will need to follow-up with Urology as an outpatient and will need a repeat ultrasound of the kidneys and bladder in 1-2 weeks to ensure the hydronephrosis has resolved.    Hydronephrosis concurrent with and due to calculi of kidney and ureter  Assessment & Plan  Please see the assessment and plan for \"Ureterolithiasis\"    Low HDL (under 40)  Assessment & Plan  Lifestyle modifications are recommended including weight loss, improving his diet, and increasing his amount of physica activity/exercise.    Hypophosphatemia  Assessment & Plan  Received K-phos, 2 tablets PO x 1 dose on 05/02/2024  Follow the phosphorus level    Vitamin D deficiency  Assessment & Plan  Initiated cholecalciferol 2000 I.U. PO Qdaily  Outpatient follow-up with PCP in regards to this matter         Latest Reference Range & Units 04/17/24 07:14   VITAMIN D, 25-HYDROXY 30.0 - 100.0 ng/mL 10.6 (L)   (L): Data is abnormally low    Hypercholesterolemia  Assessment & Plan  Restart his previous statin therapy of atorvastatin 20 mg PO Qdaily  Lifestyle modifications are recommended including weight loss, improving his diet, and increasing his amount of physica activity/exercise.  Outpatient " follow-up with PCP in regards to this matter     Latest Reference Range & Units 05/02/24 06:26   Cholesterol See Comment mg/dL 198   Triglycerides See Comment mg/dL 189 (H)   HDL >=40 mg/dL 34 (L)   Non-HDL Cholesterol mg/dl 164   LDL Calculated 0 - 100 mg/dL 126 (H)   (H): Data is abnormally high  (L): Data is abnormally low    Hepatic steatosis  Assessment & Plan  Checked a HgbA1c level on 05/20/2024, which is pending at the time of discharge  Outpatient Gastroenterology evaluation  Outpatient surveillance imaging and monitoring of LFT's (liver function tests) with his PCP    Leukocytosis  Assessment & Plan  Checked blood cultures x 2 sets and a urine culture on 05/02/2024, which are all pending at the time of discharge  Treated with ceftriaxone 1000 mg IV every 24 hours during the hospitalization  The leukocytosis resolved.  Follow the CBC after discharge with his PCP    Hypokalemia  Assessment & Plan  Resolved with potassium chloride supplementation treatment  Follow the potassium level and magnesium level after discharge with his PCP      Discharging Physician / Practitioner: Amado Perea DO  PCP: Preet Stockton MD  Admission Date:   Admission Orders (From admission, onward)       Ordered        05/01/24 0006  Place in Observation  Once                          Discharge Date: 05/02/24    Consultations During Hospital Stay:  Urology    Procedures Performed:   None    Significant Findings / Test Results:   US kidney and bladder    Result Date: 5/1/2024  Impression: Unchanged mild hydronephrosis.. Workstation performed: [id number]     CT renal stone study abdomen pelvis without contrast    Result Date: 4/30/2024  Impression: Stable 3 mm distal left ureteral calculus slightly more inferior toward the bladder image 2/149 with mild hydroureteronephrosis and mild periureteral stranding. Fatty liver. Workstation performed: [id number]      Results from last 7 days   Lab Units 05/02/24  0626 05/01/24  0441  04/30/24 2032   WBC Thousand/uL 8.17 10.83* 17.29*   HEMOGLOBIN g/dL 13.6 13.4 14.3   HEMATOCRIT % 41.6 40.6 43.2   PLATELETS Thousands/uL 209 219 235     Results from last 7 days   Lab Units 05/02/24  0626 05/01/24  1017 05/01/24 0441   SODIUM mmol/L 139 137 137   POTASSIUM mmol/L 3.7 3.8 4.1   CHLORIDE mmol/L 105 103 103   CO2 mmol/L 26 25 25   BUN mg/dL 12 17 17   CREATININE mg/dL 0.90 1.28 1.27   CALCIUM mg/dL 8.9 9.0 8.9     Results from last 7 days   Lab Units 05/02/24  0626 05/01/24  0441   MAGNESIUM mg/dL 2.2 2.0     Results from last 7 days   Lab Units 05/02/24  0626 04/30/24 2032   ALK PHOS U/L 44 45   ALT U/L 26 35   AST U/L 18 22     Microbiology Results (last 21 days)    Collected Updated Procedure Result Status Patient Facility Result Comment    05/02/2024 0730 05/02/2024 0741 Blood culture [[id number]]   Blood from Hand, Left    In process [facility]  Component Value   No component results             05/02/2024 0629 05/02/2024 0636 Blood culture [[id number]]   Blood from Hand, Left    In process [facility]  Component Value   No component results             05/01/2024 1311 05/01/2024 1313 Urine culture [[id number]]   Urine, Clean Catch    In process [facility]  Component Value   No component results             04/17/2024 0714 04/19/2024 0915 Urine culture [[id number]]   Urine    Final result   Component Value   Urine Culture No Growth <1000 cfu/mL               Incidental Findings:   As above    Test Results Pending at Discharge (will require follow-up):  Urine culture from 05/01/2024  Acute hepatitis panel and HgbA1c level on 05/02/2024  Blood cultures x 2 sets from 05/02/2024     Outpatient Tests Requested:  CBC with differential, CMP, Magnesium level, and Phosphorus level in 1 week with his PCP  Outpatient Ultrasound of the kidneys and bladder in 1-2 weeks with Urology    Complications:  None    Reason for Admission:   "Ureterolithiasis with left-sided hydronephrosis    Hospital Course:   Chin Torres is a 34 y.o. male patient who originally presented to the hospital on 4/30/2024 due to severe left-sided flank pain and a decreased urine output.    Please see above list of diagnoses and related plan for additional information.     Condition at Discharge: good    Discharge Day Visit / Exam:   Subjective:  The patient was seen and examined.  The patient is doing better.  The patient passed fragments of a stone with urination this morning.  The left-sided flank pain completed resolved.  No chest pain.  No shortness of breath.  No abdominal pain.  No nausea or vomiting.    Vitals: Blood Pressure: 149/94 (05/02/24 0801)  Pulse: 88 (05/02/24 0801)  Temperature: 98.2 °F (36.8 °C) (05/02/24 0801)  Temp Source: Oral (05/02/24 0801)  Respirations: 15 (05/02/24 0801)  Height: 5' 7\" (170.2 cm) (05/01/24 2000)  Weight - Scale: 120 kg (264 lb 15.9 oz) (05/01/24 2022)  SpO2: 97 % (05/02/24 0801)  Exam:   Physical Exam  General:  NAD, follows commands  HEENT:  NC/AT, mucous membranes moist  Neck:  Supple, No JVP elevation  CV:  + S1, + S2, RRR  Pulm:  Lung fields are CTA bilaterally  Abd:  Soft, Non-tender, Non-distended  Ext:  No clubbing/cyanosis/edema  Skin:  No rashes  Neuro:  Awake, alert, oriented  Psych:  Normal mood and affect      Discharge instructions/Information to patient and family:  See after visit summary for information provided to patient and family.      Provisions for Follow-Up Care:  See after visit summary for information related to follow-up care and any pertinent home health orders.      Mobility at time of Discharge:   Basic Mobility Inpatient Raw Score: 24  JH-HLM Goal: 8: Walk 250 feet or more  JH-HLM Achieved: 8: Walk 250 feet ot more  HLM Goal achieved. Continue to encourage appropriate mobility.     Disposition:   Home    Discharge Statement:  I spent 45 minutes discharging the patient. This time was spent on the day " of discharge. I had direct contact with the patient on the day of discharge. Greater than 50% of the total time was spent examining patient, answering all patient questions, arranging and discussing plan of care with patient as well as directly providing post-discharge instructions.  Additional time then spent on discharge activities.    Discharge Medications:  See after visit summary for reconciled discharge medications provided to patient and/or family.      **Please Note: This note may have been constructed using a voice recognition system**

## 2024-05-02 NOTE — NURSING NOTE
Dc papers and instructions given to and explained to patient with verbal understanding. Work note also provided. All belongings at side.

## 2024-05-02 NOTE — TELEPHONE ENCOUNTER
Patient passed a stone while hospitalized.  I would like him to follow-up with advanced practitioner in 1 month with x-ray and ultrasound.  These have been ordered.

## 2024-05-02 NOTE — DISCHARGE INSTRUCTIONS
[facility]  [address] [facility] [address]  Dept: [phone]    May 2, 2024     Patient: Chin Torres   YOB: 1990   Date of Visit: 4/30/2024       To Whom it May Concern:    Chin Torres is under my professional care. He was seen in the hospital from 4/30/2024 to 05/02/24. He may return to work on Monday, 5/6/24, without limitations.    If you have any questions or concerns, please don't hesitate to call.         Sincerely,          Amado Perea, DO

## 2024-05-02 NOTE — PLAN OF CARE
Problem: PAIN - ADULT  Goal: Verbalizes/displays adequate comfort level or baseline comfort level  Description: Interventions:  - Encourage patient to monitor pain and request assistance  - Assess pain using appropriate pain scale  - Administer analgesics based on type and severity of pain and evaluate response  - Implement non-pharmacological measures as appropriate and evaluate response  - Consider cultural and social influences on pain and pain management  - Notify physician/advanced practitioner if interventions unsuccessful or patient reports new pain  Outcome: Progressing     Problem: INFECTION - ADULT  Goal: Absence or prevention of progression during hospitalization  Description: INTERVENTIONS:  - Assess and monitor for signs and symptoms of infection  - Monitor lab/diagnostic results  - Monitor all insertion sites, i.e. indwelling lines, tubes, and drains  - Monitor endotracheal if appropriate and nasal secretions for changes in amount and color  - [place] appropriate cooling/warming therapies per order  - Administer medications as ordered  - Instruct and encourage patient and family to use good hand hygiene technique  - Identify and instruct in appropriate isolation precautions for identified infection/condition  Outcome: Progressing     Problem: GENITOURINARY - ADULT  Goal: Maintains or returns to baseline urinary function  Description: INTERVENTIONS:  - Assess urinary function  - Encourage oral fluids to ensure adequate hydration if ordered  - Administer IV fluids as ordered to ensure adequate hydration  - Administer ordered medications as needed  - Offer frequent toileting  - Follow urinary retention protocol if ordered  Outcome: Progressing  Goal: Absence of urinary retention  Description: INTERVENTIONS:  - Assess patient’s ability to void and empty bladder  - Monitor I/O  - Bladder scan as needed  - Discuss with physician/AP medications to alleviate retention as needed  - Discuss catheterization  for long term situations as appropriate  Outcome: Progressing     Problem: METABOLIC, FLUID AND ELECTROLYTES - ADULT  Goal: Electrolytes maintained within normal limits  Description: INTERVENTIONS:  - Monitor labs and assess patient for signs and symptoms of electrolyte imbalances  - Administer electrolyte replacement as ordered  - Monitor response to electrolyte replacements, including repeat lab results as appropriate  - Instruct patient on fluid and nutrition as appropriate  Outcome: Progressing

## 2024-05-02 NOTE — PLAN OF CARE
Problem: PAIN - ADULT  Goal: Verbalizes/displays adequate comfort level or baseline comfort level  Description: Interventions:  - Encourage patient to monitor pain and request assistance  - Assess pain using appropriate pain scale  - Administer analgesics based on type and severity of pain and evaluate response  - Implement non-pharmacological measures as appropriate and evaluate response  - Consider cultural and social influences on pain and pain management  - Notify physician/advanced practitioner if interventions unsuccessful or patient reports new pain  Outcome: Progressing     Problem: INFECTION - ADULT  Goal: Absence or prevention of progression during hospitalization  Description: INTERVENTIONS:  - Assess and monitor for signs and symptoms of infection  - Monitor lab/diagnostic results  - Monitor all insertion sites, i.e. indwelling lines, tubes, and drains  - Monitor endotracheal if appropriate and nasal secretions for changes in amount and color  - [place] appropriate cooling/warming therapies per order  - Administer medications as ordered  - Instruct and encourage patient and family to use good hand hygiene technique  - Identify and instruct in appropriate isolation precautions for identified infection/condition  Outcome: Progressing  Goal: Absence of fever/infection during neutropenic period  Description: INTERVENTIONS:  - Monitor WBC    Outcome: Progressing

## 2024-05-02 NOTE — ASSESSMENT & PLAN NOTE
Lifestyle modifications are recommended including weight loss, improving his diet, and increasing his amount of physica activity/exercise.

## 2024-05-02 NOTE — QUICK NOTE
"Contacted by the onsite advanced practitioner and medical team.  Patient passed some gravel overnight and feels very well.  Patient is deferring transfer for surgical intervention.  Given age and small size of stone, I think this is absolutely acceptable.  Patient can be discharged home on Flomax 0.4 mg nightly for the next month along with anti-inflammatories for any resolving discomfort.  He can have follow-up in our office within the next month for a follow-up x-ray and ultrasound image.  He can be given our office number to call with any questions or concerns or if he begins to develop any recurrent pain.    /80 (BP Location: Right arm)   Pulse 95   Temp 98 °F (36.7 °C) (Oral)   Resp 20   Ht 5' 7\" (1.702 m)   Wt 120 kg (264 lb 15.9 oz)   SpO2 96%   BMI 41.50 kg/m²      Lab Results   Component Value Date    SODIUM 139 05/02/2024    K 3.7 05/02/2024     05/02/2024    CO2 26 05/02/2024    BUN 12 05/02/2024    CREATININE 0.90 05/02/2024    GLUC 105 05/02/2024    CALCIUM 8.9 05/02/2024     Lab Results   Component Value Date    WBC 8.17 05/02/2024    HGB 13.6 05/02/2024    HCT 41.6 05/02/2024    MCV 90 05/02/2024     05/02/2024       "

## 2024-05-02 NOTE — TELEPHONE ENCOUNTER
Called and spoke with Chin. He was scheduled for an appointment with JULY Keane in the [place] office on 6/6/24 @ 3:00 pm. Patient aware to have KUB and renal US completed 1 week prior to 4 week follow up. Central scheduling phone number provided.

## 2024-05-02 NOTE — UTILIZATION REVIEW
Initial Clinical Review    Admission: Date/Time/Statement:   Admission Orders (From admission, onward)       Ordered        05/01/24 0006  Place in Observation  Once                          Orders Placed This Encounter   Procedures    Place in Observation     Standing Status:   Standing     Number of Occurrences:   1     Order Specific Question:   Level of Care     Answer:   Med Surg [16]     ED Arrival Information       Expected   -    Arrival   4/30/2024 20:08    Acuity   Urgent              Means of arrival   Walk-In    Escorted by   Self    Service   Hospitalist    Admission type   Emergency              Arrival complaint   Kidney Stone             Chief Complaint   Patient presents with    Flank Pain     Left sided flank pain started about 5 hrs ago pt stated he drank 5 bottles of water and has not been able to urinate 10/10 pain        Initial Presentation: 34 y.o. male to the ED from home with complaints of left sided flank pain which started about 5 hours prior. Admitted under observation for kidney stone.  Arrives with elevated bp. H/O asthma depression anxiety and kidney stones . Diagnosed with a 3 mm left distal ureteral stone on 4/17/2024 on CT scan. Has had difficulty urinating, urinary frequency. In the ED, give IV morphine, toradol, iv zofran, iv fluids x 2 liters. Wbcs 17.29.  Continue with IV fluids. Urology consult.  Keep NPO.     Urology consult: Continue with IV fluids. Strain all urine. Trial medical expulsive therapy with flomax, iv fluids.     Date:     Day 2:      ED Triage Vitals   Temperature Pulse Respirations Blood Pressure SpO2   04/30/24 2013 04/30/24 2013 04/30/24 2013 04/30/24 2013 04/30/24 2013   98.2 °F (36.8 °C) 91 20 (!) 189/118 97 %      Temp Source Heart Rate Source Patient Position - Orthostatic VS BP Location FiO2 (%)   04/30/24 2013 05/01/24 0011 04/30/24 2013 04/30/24 2013 --   Temporal Monitor Lying Right arm       Pain Score       04/30/24 2013       9          Wt  Readings from Last 1 Encounters:   05/01/24 120 kg (264 lb 15.9 oz)     Additional Vital Signs: ital Signs (last 2 days)    Date/Time Temp Pulse Resp BP MAP (mmHg) SpO2 O2 Device Patient Position - Orthostatic VS   05/01/24 23:53:31 98 °F (36.7 °C) 95 20 157/80 106 96 % None (Room air) Sitting   05/01/24 2000 -- -- -- -- -- -- None (Room air) --   05/01/24 1515 -- -- -- -- -- -- None (Room air) --   05/01/24 1500 -- -- 18 -- -- -- None (Room air) --   05/01/24 1438 98.5 °F (36.9 °C) 78 -- 151/94 113 -- -- --   05/01/24 14:37:41 98.5 °F (36.9 °C) 78 -- 151/94 113 97 % -- --   05/01/24 0015 -- 55 -- 143/79 102 95 % -- --   05/01/24 0011 -- 72 19 143/79 -- 96 % None (Room air) Sitting   04/30/24 2130 -- 78 -- -- -- 98 % -- --   04/30/24 2013 98.2 °F (36.8 °C) 91 20 189/118 Abnormal  -- 97 % None (Room air) Lying     Pertinent Labs/Diagnostic Test Results:   US kidney and bladder   Final Result by Sourav Flores MD (05/01 1109)      Unchanged mild hydronephrosis..            Workstation performed: [id number]         CT renal stone study abdomen pelvis without contrast   Final Result by Irving Gutierrez MD (04/30 2141)      Stable 3 mm distal left ureteral calculus slightly more inferior toward the bladder image 2/149 with mild hydroureteronephrosis and mild periureteral stranding.      Fatty liver.         Workstation performed: [id number]         FL retrograde pyelogram    (Results Pending)         Results from last 7 days   Lab Units 05/02/24  0626 05/01/24  0441 04/30/24 2032   WBC Thousand/uL 8.17 10.83* 17.29*   HEMOGLOBIN g/dL 13.6 13.4 14.3   HEMATOCRIT % 41.6 40.6 43.2   PLATELETS Thousands/uL 209 219 235   TOTAL NEUT ABS Thousands/µL 4.93  --  11.57*         Results from last 7 days   Lab Units 05/02/24  0626 05/01/24  1017 05/01/24  0441 04/30/24  2032   SODIUM mmol/L 139 137 137 136   POTASSIUM mmol/L 3.7 3.8 4.1 3.4*   CHLORIDE mmol/L 105 103 103 100   CO2 mmol/L 26 25 25 24   ANION GAP mmol/L 8 9 9  "12   BUN mg/dL 12 17 17 17   CREATININE mg/dL 0.90 1.28 1.27 1.17   EGFR ml/min/1.73sq m 112 73 74 82   CALCIUM mg/dL 8.9 9.0 8.9 9.5   MAGNESIUM mg/dL 2.2  --  2.0  --    PHOSPHORUS mg/dL 2.4*  --   --   --      Results from last 7 days   Lab Units 05/02/24  0626 04/30/24 2032   AST U/L 18 22   ALT U/L 26 35   ALK PHOS U/L 44 45   TOTAL PROTEIN g/dL 7.2 7.8   ALBUMIN g/dL 4.2 4.5   TOTAL BILIRUBIN mg/dL 0.67 0.66         Results from last 7 days   Lab Units 05/02/24  0626 05/01/24  1017 05/01/24  0441 04/30/24 2032   GLUCOSE RANDOM mg/dL 105 89 102 92             No results found for: \"BETA-HYDROXYBUTYRATE\"                           Results from last 7 days   Lab Units 05/02/24  0626   PROTIME seconds 12.9   INR  0.98                                             Results from last 7 days   Lab Units 04/30/24 2032   LIPASE u/L 18                 Results from last 7 days   Lab Units 05/01/24  1311 04/30/24  2148   CLARITY UA  Clear Other   COLOR UA  Yellow Light Yellow   SPEC GRAV UA  >=1.030 1.020   PH UA  6.0 6.0   GLUCOSE UA mg/dl Negative Negative   KETONES UA mg/dl Negative Negative   BLOOD UA  Trace* Large*   PROTEIN UA mg/dl Negative Negative   NITRITE UA  Negative Negative   BILIRUBIN UA  Negative Negative   UROBILINOGEN UA (BE) mg/dl <2.0 <2.0   LEUKOCYTES UA  Negative Negative   WBC UA /hpf 0-1* 0-1   RBC UA /hpf 0-1* 30-50*   BACTERIA UA /hpf None Seen None Seen   EPITHELIAL CELLS WET PREP /hpf None Seen None Seen                                                   ED Treatment:   Medication Administration from 04/30/2024 2008 to 05/01/2024 1429         Date/Time Order Dose Route Action Action by Comments     04/30/2024 2030 EDT ondansetron (ZOFRAN) injection 4 mg 4 mg Intravenous Given Basilio Meyer Jr., RN --     04/30/2024 2031 EDT ketorolac (TORADOL) injection 15 mg 15 mg Intravenous Given Basilio Meyer Jr., RN --     04/30/2024 2159 EDT lactated ringers bolus 1,000 mL 0 mL Intravenous Stopped " Desmond Smith, RN --     04/30/2024 2031 EDT lactated ringers bolus 1,000 mL 1,000 mL Intravenous New Bag Basilio Meyer Jr., RN --     04/30/2024 2125 EDT morphine injection 4 mg 4 mg Intravenous Given Desmond Smith, RN --     04/30/2024 2247 EDT tamsulosin (FLOMAX) capsule 0.4 mg 0.4 mg Oral Given Basilio Meyer Jr., RN --     04/30/2024 2247 EDT oxyCODONE (ROXICODONE) IR tablet 5 mg 5 mg Oral Given Basilio Meyer Jr., RN --     04/30/2024 2336 EDT morphine injection 4 mg 4 mg Intravenous Given Desmond Smith, RN --     05/01/2024 0055 EDT lactated ringers bolus 1,000 mL 0 mL Intravenous Stopped Basilio Meyer Jr., RN --     05/01/2024 0011 EDT lactated ringers bolus 1,000 mL 1,000 mL Intravenous New Bag Desmond Smith, RN --     05/01/2024 0200 EDT sodium chloride 0.9 % infusion 150 mL/hr Intravenous New Bag Desmond Smith, RN --     05/01/2024 0732 EDT heparin (porcine) subcutaneous injection 5,000 Units 5,000 Units Subcutaneous Not Given Nidia Duggan, SKYE --     05/01/2024 0142 EDT heparin (porcine) subcutaneous injection 5,000 Units 5,000 Units Subcutaneous Not Given Desmond Smith RN --     05/01/2024 0909 EDT ketorolac (TORADOL) injection 15 mg 15 mg Intravenous Given Nidia Duggan, SKYE --     05/01/2024 0205 EDT ketorolac (TORADOL) injection 15 mg 0 mg Intravenous Hold Desmond Smith, SKYE --     05/01/2024 0158 EDT ketorolac (TORADOL) injection 15 mg 15 mg Intravenous Given Desmond Smith, SKYE --     05/01/2024 0142 EDT potassium chloride 40 mEq in sodium chloride 0.9 % 100 mL IVPB -- Intravenous Canceled Entry Desmond Smith RN --     05/01/2024 0408 EDT potassium chloride 20 mEq IVPB (premix) 0 mEq Intravenous Stopped Desmond Smith RN --     05/01/2024 0204 EDT potassium chloride 20 mEq IVPB (premix) 20 mEq Intravenous New Bag Desmond Smith RN --          Past Medical History:   Diagnosis Date    Anxiety     Asthma     Depression         Admitting Diagnosis: Ureterolithiasis [N20.1]  Elevated blood pressure reading [R03.0]  Flank pain [R10.9]  Age/Sex: 34 y.o. male  Admission Orders:  Scheduled Medications:  cefTRIAXone, 1,000 mg, Intravenous, Q24H  cholecalciferol, 2,000 Units, Oral, Daily  heparin (porcine), 5,000 Units, Subcutaneous, Q8H MURPHY  ketorolac, 15 mg, Intravenous, Q6H MURPHY  potassium-sodium phosphateS, 2 tablet, Oral, Once  tamsulosin, 0.4 mg, Oral, Daily With Dinner      Continuous IV Infusions:     PRN Meds:  acetaminophen, 650 mg, Oral, Q6H PRN  morphine injection, 2 mg, Intravenous, Q4H PRN  morphine injection, 4 mg, Intravenous, Q4H PRN        IP CONSULT TO UROLOGY    Network Utilization Review Department  ATTENTION: Please call with any questions or concerns to [phone] and carefully listen to the prompts so that you are directed to the right person. All voicemails are confidential.   For Discharge needs, contact Care Management DC Support Team at [phone] opt. [phone]  Send all requests for admission clinical reviews, approved or denied determinations and any other requests to dedicated fax number below belonging to the campus where the patient is receiving treatment. List of dedicated fax numbers for the Facilities:  FACILITY NAME UR FAX NUMBER   ADMISSION DENIALS (Administrative/Medical Necessity) [phone]   DISCHARGE SUPPORT TEAM (NETWORK) [phone]   PARENT CHILD HEALTH (Maternity/NICU/Pediatrics) [phone]   [facility] [phone]   [facility] [phone]   [facility] [phone]   [facility] [phone]   [facility] [phone]   [facility] [phone]   [facility] [phone]   [facility] [phone]   [facility] [phone]   [facility] [phone]   [facility] [phone]   [facility] [phone]

## 2024-05-02 NOTE — CASE MANAGEMENT
Case Management Discharge Planning Note    Patient name Chin Torres  Location /405-01 MRN [id number]  : 1990 Date 2024       Current Admission Date: 2024  Current Admission Diagnosis:Ureterolithiasis   Patient Active Problem List    Diagnosis Date Noted    Hypophosphatemia 2024    Kidney stone 2024    Hypokalemia 2024    Leukocytosis 2024    Hepatic steatosis 2024    Ureterolithiasis 2024    Hydronephrosis concurrent with and due to calculi of kidney and ureter 2024    Hypercholesterolemia 2024    Vitamin D deficiency 2024      LOS (days): 0  Geometric Mean LOS (GMLOS) (days):   Days to GMLOS:     OBJECTIVE:            Current admission status: Observation   Preferred Pharmacy:   [id number] [place] [facility] [address] [place] [id number]  Phone: [phone] Fax: [phone]    Primary Care Provider: Preet Stockton MD    Primary Insurance: [facility]  Secondary Insurance:     DISCHARGE DETAILS:  Pt is being dc'd home on this date and will be following up with PCP; GI and Urology as an OP.

## 2024-05-07 LAB
BACTERIA BLD CULT: NORMAL
BACTERIA BLD CULT: NORMAL

## 2024-05-08 LAB
ALBUMIN SERPL BCP-MCNC: 4.2 G/DL (ref 3.5–5)
ALBUMIN SERPL BCP-MCNC: 4.5 G/DL (ref 3.5–5)
ALP SERPL-CCNC: 44 U/L (ref 34–104)
ALP SERPL-CCNC: 45 U/L (ref 34–104)
ALT SERPL W P-5'-P-CCNC: 26 U/L (ref 7–52)
ALT SERPL W P-5'-P-CCNC: 35 U/L (ref 7–52)
ANION GAP SERPL CALCULATED.3IONS-SCNC: 12 MMOL/L (ref 4–13)
ANION GAP SERPL CALCULATED.3IONS-SCNC: 8 MMOL/L (ref 4–13)
AST SERPL W P-5'-P-CCNC: 18 U/L (ref 13–39)
AST SERPL W P-5'-P-CCNC: 22 U/L (ref 13–39)
BILIRUB SERPL-MCNC: 0.66 MG/DL (ref 0.2–1)
BILIRUB SERPL-MCNC: 0.67 MG/DL (ref 0.2–1)
BUN SERPL-MCNC: 12 MG/DL (ref 5–25)
BUN SERPL-MCNC: 17 MG/DL (ref 5–25)
CALCIUM SERPL-MCNC: 8.9 MG/DL (ref 8.4–10.2)
CALCIUM SERPL-MCNC: 9.5 MG/DL (ref 8.4–10.2)
CHLORIDE SERPL-SCNC: 100 MMOL/L (ref 96–108)
CHLORIDE SERPL-SCNC: 105 MMOL/L (ref 96–108)
CO2 SERPL-SCNC: 24 MMOL/L (ref 21–32)
CO2 SERPL-SCNC: 26 MMOL/L (ref 21–32)
CREAT SERPL-MCNC: 0.9 MG/DL (ref 0.6–1.3)
CREAT SERPL-MCNC: 1.17 MG/DL (ref 0.6–1.3)
GFR SERPL CREATININE-BSD FRML MDRD: 112 ML/MIN/1.73SQ M
GFR SERPL CREATININE-BSD FRML MDRD: 82 ML/MIN/1.73SQ M
GLUCOSE P FAST SERPL-MCNC: 105 MG/DL (ref 65–99)
GLUCOSE SERPL-MCNC: 105 MG/DL (ref 65–140)
GLUCOSE SERPL-MCNC: 92 MG/DL (ref 65–140)
POTASSIUM SERPL-SCNC: 3.4 MMOL/L (ref 3.5–5.3)
POTASSIUM SERPL-SCNC: 3.7 MMOL/L (ref 3.5–5.3)
PROT SERPL-MCNC: 7.2 G/DL (ref 6.4–8.4)
PROT SERPL-MCNC: 7.8 G/DL (ref 6.4–8.4)
SODIUM SERPL-SCNC: 136 MMOL/L (ref 135–147)
SODIUM SERPL-SCNC: 139 MMOL/L (ref 135–147)

## 2024-06-10 ENCOUNTER — CONSULT (OUTPATIENT)
Dept: GASTROENTEROLOGY | Facility: CLINIC | Age: 34
End: 2024-06-10
Payer: COMMERCIAL

## 2024-06-10 VITALS
HEART RATE: 68 BPM | TEMPERATURE: 98.5 F | RESPIRATION RATE: 18 BRPM | BODY MASS INDEX: 40.1 KG/M2 | DIASTOLIC BLOOD PRESSURE: 90 MMHG | HEIGHT: 68 IN | OXYGEN SATURATION: 96 % | SYSTOLIC BLOOD PRESSURE: 155 MMHG | WEIGHT: 264.6 LBS

## 2024-06-10 DIAGNOSIS — K76.0 HEPATIC STEATOSIS: Primary | ICD-10-CM

## 2024-06-10 DIAGNOSIS — Z00.00 HEALTHCARE MAINTENANCE: ICD-10-CM

## 2024-06-10 DIAGNOSIS — R11.0 NAUSEA: ICD-10-CM

## 2024-06-10 PROCEDURE — 99203 OFFICE O/P NEW LOW 30 MIN: CPT | Performed by: PHYSICIAN ASSISTANT

## 2024-06-10 NOTE — PROGRESS NOTES
[facility] - Outpatient Consultation  Chin Torres 34 y.o. male MRN: [id number]  Encounter: [id number]    ASSESSMENT AND PLAN:      1. Hepatic steatosis  2. BMI 40.0-44.9, adult (HCC)    Pt referred for hepatic steatosis, incidental finding on imaging.   Suspect NAFLD/metabolic etiol. LFTs wnl, synthetic function intact.   Fibrosis score -0.31, indeterminate.   Reviewed dx and sequelae of dz.   Recommend elastography now.     Discussed recommendations in regards to fatty liver including:   Strict control of contributing comorbidities (obesity, prediabetes/diabetes, hypertension, and hypertriglyceridemia).  Weight loss of approx 10-15% of patient's current body weight over a period of 6-12 months through low fat diet and cardiovascular exercise as tolerated - Reffered patient to weight management for assistance with weight loss.  Limiting alcohol consumption, preferably complete abstinence.  Monitor hepatic function every 6 months with routine labs.     - Ambulatory Referral to Gastroenterology  - US elastography/UGAP; Future  - Hepatitis A antibody, total; Future  - Hepatitis B surface antibody; Future  - Ambulatory Referral to Weight Management; Future    3. Nausea    Pt notes nausea, gagging with certain scents, AM nausea.   No sig heartburn/indigestion/reflux symptoms.   No emesis. No weight loss.   Start with non-invasive work-up, check h pylori and celiac testing.   Check UGI series for any obvious UGI tract abnormalities.   May require additional investigation in the future.     - IgA; Future  - Tissue transglutaminase, IgA; Future  - H. pylori antigen, stool; Future  - FL upper GI UGI; Future    4. Healthcare maintenance    Pt is of average risk for CRC screening, no family hx of CRC or advanced adenoma at young age.   Due for index colonoscopy at age 47, sooner if clinically indicated.   Recommend pt follow up to review lab abnormalities with PCP, including prediabetes,  HLD.    We will follow up in 6 months to reassess fatty liver, repeat LFTs at that time.   ______________________________________________________________________    HPI: Patient is a 34 y.o. male who presents today for a consultation regarding hepatic steatosis. Pmhx sig for hepatic steatosis, kidney stones, HLD, Vit D deficiency, BMI 41.     Pt is new to clinic. Here today to review hepatic steatosis. Pt denies any known hx of liver disease. No family hx of liver disease. No yellowing of eyes/skin. No chronic etoh intake. No swelling abd/legs. No pruritus. No hx of illicit drug use, tattoos, transfusion, etc. No regular tylenol or NSAID use. No herbal supplement usage.     Pt does have some heartburn symptoms depending on oral intake. Sometimes notes AM nausea, bad gag reflex if he smells anything abnormal. Pt denies any emesis, dysphagia or odynophagia. Feels like he is awakening with phlegm that he is trying to cough up. Pt denies any unintentional weight loss over past 6 months.     Pt is having formed brown stools daily. Pt denies any constipation or diarrhea. No BRBPR or melena. No nocturnal BM.     Diet is typically leftovers, cheese steaks, chicken, rice, corn, etc.     04/2024: CT A/P: Stable 3 mm distal left ureteral calculus slightly more inferior toward the bladder image 2/149 with mild hydroureteronephrosis and mild periureteral stranding. Fatty liver.  04/2024: Hb 13.6, MCV 90, Plt 209, BUN 12, Cr 0.90, AST 18, ALT 26, ALP 44, albumin 44, albumin 4.2      Acetaminophen: prn   NSAIDs: prn   Etoh: none   Tobacco: none   Cannabis: none     Endoscopic history:   EGD: none   Colon: none     Review of Systems   Constitutional:  Negative for appetite change, fever and unexpected weight change.   HENT:  Negative for mouth sores and trouble swallowing.    Gastrointestinal:  Positive for nausea. Negative for abdominal pain, blood in stool, constipation, diarrhea and vomiting.   Endocrine: Negative.    Skin:   "Negative for rash.   Neurological:  Negative for seizures and syncope.   Otherwise Per HPI    Historical Information   Past Medical History:   Diagnosis Date    Anxiety     Asthma     Depression      Past Surgical History:   Procedure Laterality Date    TONSILLECTOMY       Social History   Social History     Substance and Sexual Activity   Alcohol Use Yes    Comment: rare     Social History     Substance and Sexual Activity   Drug Use No     Social History     Tobacco Use   Smoking Status Never   Smokeless Tobacco Never     History reviewed. No pertinent family history.    Meds/Allergies       Current Outpatient Medications:     acetaminophen (TYLENOL) 325 mg tablet    atorvastatin (LIPITOR) 20 mg tablet    Cholecalciferol (VITAMIN D3) 1,000 units tablet    tamsulosin (FLOMAX) 0.4 mg    No Known Allergies    Objective     Blood pressure 155/90, pulse 68, temperature 98.5 °F (36.9 °C), temperature source Temporal, resp. rate 18, height 5' 7.5\" (1.715 m), weight 120 kg (264 lb 9.6 oz), SpO2 96%. Body mass index is 40.83 kg/m².    Physical Exam  Vitals and nursing note reviewed.   Constitutional:       General: He is not in acute distress.     Appearance: He is well-developed. He is obese.   HENT:      Head: Normocephalic and atraumatic.   Eyes:      General: No scleral icterus.     Conjunctiva/sclera: Conjunctivae normal.   Cardiovascular:      Rate and Rhythm: Normal rate.   Pulmonary:      Effort: Pulmonary effort is normal. No respiratory distress.      Breath sounds: Wheezing present.   Abdominal:      General: Bowel sounds are normal. There is no distension.      Palpations: Abdomen is soft.      Tenderness: There is no abdominal tenderness. There is no guarding or rebound.   Skin:     General: Skin is warm and dry.      Coloration: Skin is not jaundiced.   Neurological:      General: No focal deficit present.      Mental Status: He is alert and oriented to person, place, and time.   Psychiatric:         Mood and " Affect: Mood normal.        Lab Results:   No visits with results within 1 Day(s) from this visit.   Latest known visit with results is:   Admission on 04/30/2024, Discharged on 05/02/2024   Component Date Value    WBC 04/30/2024 17.29 (H)     RBC 04/30/2024 4.82     Hemoglobin 04/30/2024 14.3     Hematocrit 04/30/2024 43.2     MCV 04/30/2024 90     MCH 04/30/2024 29.7     MCHC 04/30/2024 33.1     RDW 04/30/2024 12.5     MPV 04/30/2024 11.0     Platelets 04/30/2024 235     nRBC 04/30/2024 0     Segmented % 04/30/2024 66     Immature Grans % 04/30/2024 0     Lymphocytes % 04/30/2024 22     Monocytes % 04/30/2024 10     Eosinophils Relative 04/30/2024 1     Basophils Relative 04/30/2024 1     Absolute Neutrophils 04/30/2024 11.57 (H)     Absolute Immature Grans 04/30/2024 0.05     Absolute Lymphocytes 04/30/2024 3.71     Absolute Monocytes 04/30/2024 1.72 (H)     Eosinophils Absolute 04/30/2024 0.16     Basophils Absolute 04/30/2024 0.08     Sodium 04/30/2024 136     Potassium 04/30/2024 3.4 (L)     Chloride 04/30/2024 100     CO2 04/30/2024 24     ANION GAP 04/30/2024 12     BUN 04/30/2024 17     Creatinine 04/30/2024 1.17     Glucose 04/30/2024 92     Calcium 04/30/2024 9.5     AST 04/30/2024 22     ALT 04/30/2024 35     Alkaline Phosphatase 04/30/2024 45     Total Protein 04/30/2024 7.8     Albumin 04/30/2024 4.5     Total Bilirubin 04/30/2024 0.66     eGFR 04/30/2024 82     Color, UA 04/30/2024 Light Yellow     Clarity, UA 04/30/2024 Other     Specific Gravity, UA 04/30/2024 1.020     pH, UA 04/30/2024 6.0     Leukocytes, UA 04/30/2024 Negative     Nitrite, UA 04/30/2024 Negative     Protein, UA 04/30/2024 Negative     Glucose, UA 04/30/2024 Negative     Ketones, UA 04/30/2024 Negative     Urobilinogen, UA 04/30/2024 <2.0     Bilirubin, UA 04/30/2024 Negative     Occult Blood, UA 04/30/2024 Large (A)     Lipase 04/30/2024 18     RBC, UA 04/30/2024 30-50 (A)     WBC, UA 04/30/2024 0-1     Epithelial Cells  04/30/2024 None Seen     Bacteria, UA 04/30/2024 None Seen     Sodium 05/01/2024 137     Potassium 05/01/2024 4.1     Chloride 05/01/2024 103     CO2 05/01/2024 25     ANION GAP 05/01/2024 9     BUN 05/01/2024 17     Creatinine 05/01/2024 1.27     Glucose 05/01/2024 102     Glucose, Fasting 05/01/2024 102 (H)     Calcium 05/01/2024 8.9     eGFR 05/01/2024 74     Magnesium 05/01/2024 2.0     WBC 05/01/2024 10.83 (H)     RBC 05/01/2024 4.46     Hemoglobin 05/01/2024 13.4     Hematocrit 05/01/2024 40.6     MCV 05/01/2024 91     MCH 05/01/2024 30.0     MCHC 05/01/2024 33.0     RDW 05/01/2024 12.5     Platelets 05/01/2024 219     MPV 05/01/2024 11.4     Color, UA 05/01/2024 Yellow     Clarity, UA 05/01/2024 Clear     Specific Gravity, UA 05/01/2024 >=1.030     pH, UA 05/01/2024 6.0     Leukocytes, UA 05/01/2024 Negative     Nitrite, UA 05/01/2024 Negative     Protein, UA 05/01/2024 Negative     Glucose, UA 05/01/2024 Negative     Ketones, UA 05/01/2024 Negative     Urobilinogen, UA 05/01/2024 <2.0     Bilirubin, UA 05/01/2024 Negative     Occult Blood, UA 05/01/2024 Trace (A)     RBC, UA 05/01/2024 0-1 (A)     WBC, UA 05/01/2024 0-1 (A)     Epithelial Cells 05/01/2024 None Seen     Bacteria, UA 05/01/2024 None Seen     Urine Culture 05/01/2024 No Growth <1000 cfu/mL     Sodium 05/01/2024 137     Potassium 05/01/2024 3.8     Chloride 05/01/2024 103     CO2 05/01/2024 25     ANION GAP 05/01/2024 9     BUN 05/01/2024 17     Creatinine 05/01/2024 1.28     Glucose 05/01/2024 89     Glucose, Fasting 05/01/2024 89     Calcium 05/01/2024 9.0     eGFR 05/01/2024 73     WBC 05/02/2024 8.17     RBC 05/02/2024 4.61     Hemoglobin 05/02/2024 13.6     Hematocrit 05/02/2024 41.6     MCV 05/02/2024 90     MCH 05/02/2024 29.5     MCHC 05/02/2024 32.7     RDW 05/02/2024 12.8     MPV 05/02/2024 11.0     Platelets 05/02/2024 209     nRBC 05/02/2024 0     Segmented % 05/02/2024 61     Immature Grans % 05/02/2024 0     Lymphocytes %  05/02/2024 28     Monocytes % 05/02/2024 9     Eosinophils Relative 05/02/2024 1     Basophils Relative 05/02/2024 1     Absolute Neutrophils 05/02/2024 4.93     Absolute Immature Grans 05/02/2024 0.01     Absolute Lymphocytes 05/02/2024 2.31     Absolute Monocytes 05/02/2024 0.77     Eosinophils Absolute 05/02/2024 0.11     Basophils Absolute 05/02/2024 0.04     Sodium 05/02/2024 139     Potassium 05/02/2024 3.7     Chloride 05/02/2024 105     CO2 05/02/2024 26     ANION GAP 05/02/2024 8     BUN 05/02/2024 12     Creatinine 05/02/2024 0.90     Glucose 05/02/2024 105     Glucose, Fasting 05/02/2024 105 (H)     Calcium 05/02/2024 8.9     AST 05/02/2024 18     ALT 05/02/2024 26     Alkaline Phosphatase 05/02/2024 44     Total Protein 05/02/2024 7.2     Albumin 05/02/2024 4.2     Total Bilirubin 05/02/2024 0.67     eGFR 05/02/2024 112     Magnesium 05/02/2024 2.2     Phosphorus 05/02/2024 2.4 (L)     Procalcitonin 05/02/2024 0.07     Protime 05/02/2024 12.9     INR 05/02/2024 0.98     Hepatitis B Surface Ag 05/02/2024 Non-reactive     Hep A IgM 05/02/2024 Non-reactive     Hepatitis C Ab 05/02/2024 Non-reactive     Hep B C IgM 05/02/2024 Non-reactive     Hemoglobin A1C 05/02/2024 6.2 (H)     EAG 05/02/2024 131     Cholesterol 05/02/2024 198     Triglycerides 05/02/2024 189 (H)     HDL, Direct 05/02/2024 34 (L)     LDL Calculated 05/02/2024 126 (H)     Non-HDL-Chol (CHOL-HDL) 05/02/2024 164     Blood Culture 05/02/2024 No Growth After 5 Days.     Blood Culture 05/02/2024 No Growth After 5 Days.        Radiology Results:   No results found.    Dorene Kruger PA-C    **Please note:  Dictation voice to text software may have been used in the creation of this record.  Occasional wrong word or “sound alike” substitutions may have occurred due to the inherent limitations of voice recognition software.  Read the chart carefully and recognize, using context, where substitutions have occurred.**

## 2024-06-10 NOTE — PATIENT INSTRUCTIONS
Work on control of blood pressure, blood sugar, cholesterol/triglycerides for control of fatty liver.   Follow through with weight management.   Talk to primary care about your blood sugar, blood pressure, and cholesterol/triglycerides.     For nausea, check stool testing for h pylori infection.   Okay for as needed pecpid.   Check barium swallow to look at your anatomy.

## 2024-07-02 ENCOUNTER — HOSPITAL ENCOUNTER (OUTPATIENT)
Dept: ULTRASOUND IMAGING | Facility: HOSPITAL | Age: 34
Discharge: HOME | End: 2024-07-02
Payer: COMMERCIAL

## 2024-07-02 ENCOUNTER — HOSPITAL ENCOUNTER (OUTPATIENT)
Dept: RADIOLOGY | Facility: HOSPITAL | Age: 34
Discharge: HOME | End: 2024-07-02
Payer: COMMERCIAL

## 2024-07-02 DIAGNOSIS — R11.0 NAUSEA: ICD-10-CM

## 2024-07-02 DIAGNOSIS — K76.0 HEPATIC STEATOSIS: ICD-10-CM

## 2024-07-02 PROCEDURE — 76981 USE PARENCHYMA: CPT

## 2024-07-02 PROCEDURE — 74240 X-RAY XM UPR GI TRC 1CNTRST: CPT

## 2024-07-03 ENCOUNTER — TELEPHONE (OUTPATIENT)
Dept: GASTROENTEROLOGY | Facility: CLINIC | Age: 34
End: 2024-07-03

## 2024-07-03 DIAGNOSIS — K21.9 GASTROESOPHAGEAL REFLUX DISEASE, UNSPECIFIED WHETHER ESOPHAGITIS PRESENT: ICD-10-CM

## 2024-07-03 DIAGNOSIS — R11.0 NAUSEA: Primary | ICD-10-CM

## 2024-07-03 RX ORDER — PANTOPRAZOLE SODIUM 40 MG/1
40 TABLET, DELAYED RELEASE ORAL DAILY
Qty: 30 TABLET | Refills: 5 | Status: SHIPPED | OUTPATIENT
Start: 2024-07-03

## 2024-07-03 NOTE — TELEPHONE ENCOUNTER
----- Message from Dorene Kruger PA-C sent at 7/3/2024 11:41 AM EDT -----  Patient swallow test did show some GERD or reflux.  I would like to try him on a course of pantoprazole as I think this may help with his nausea as well.  Would he be agreeable to this?

## 2024-07-03 NOTE — TELEPHONE ENCOUNTER
Called and spoke to Chin. He is agreeable to your recommendation of pantoprazole. [facility] [place] [facility]

## 2024-07-09 ENCOUNTER — TELEPHONE (OUTPATIENT)
Dept: GASTROENTEROLOGY | Facility: CLINIC | Age: 34
End: 2024-07-09

## 2024-07-09 NOTE — TELEPHONE ENCOUNTER
----- Message from Dorene Kruger PA-C sent at 7/9/2024  7:30 AM EDT -----  Please inform patient that the special ultrasound of the liver does confirm fatty liver though shows minimal to no scarring or hardening of the liver, which is reassuring.  The treatment for fatty liver is slow sustained weight loss, avoiding alcohol, and controlling blood sugar, blood pressure, cholesterol/triglycerides.

## 2024-12-19 ENCOUNTER — OFFICE VISIT (OUTPATIENT)
Dept: URGENT CARE | Facility: CLINIC | Age: 34
End: 2024-12-19
Payer: COMMERCIAL

## 2024-12-19 VITALS
HEART RATE: 80 BPM | TEMPERATURE: 98.2 F | OXYGEN SATURATION: 96 % | SYSTOLIC BLOOD PRESSURE: 141 MMHG | DIASTOLIC BLOOD PRESSURE: 93 MMHG | RESPIRATION RATE: 18 BRPM

## 2024-12-19 DIAGNOSIS — J20.9 ACUTE BRONCHITIS, UNSPECIFIED ORGANISM: Primary | ICD-10-CM

## 2024-12-19 PROCEDURE — 99213 OFFICE O/P EST LOW 20 MIN: CPT | Performed by: PHYSICIAN ASSISTANT

## 2024-12-19 RX ORDER — DEXTROMETHORPHAN HYDROBROMIDE AND PROMETHAZINE HYDROCHLORIDE 15; 6.25 MG/5ML; MG/5ML
5 SYRUP ORAL 4 TIMES DAILY PRN
Qty: 118 ML | Refills: 0 | Status: SHIPPED | OUTPATIENT
Start: 2024-12-19

## 2024-12-19 RX ORDER — ALBUTEROL SULFATE 90 UG/1
2 INHALANT RESPIRATORY (INHALATION) EVERY 6 HOURS PRN
Qty: 6.7 G | Refills: 0 | Status: SHIPPED | OUTPATIENT
Start: 2024-12-19

## 2024-12-19 RX ORDER — METHYLPREDNISOLONE 4 MG/1
TABLET ORAL
Qty: 21 TABLET | Refills: 0 | Status: SHIPPED | OUTPATIENT
Start: 2024-12-19

## 2024-12-19 NOTE — PROGRESS NOTES
[facility]        NAME: Chin Torres is a 34 y.o. male  : 1990    MRN: [id number]  DATE: 2024  TIME: 10:35 AM    Assessment and Plan   Acute bronchitis, unspecified organism [J20.9]  1. Acute bronchitis, unspecified organism  methylPREDNISolone 4 MG tablet therapy pack    albuterol (Proventil HFA) 90 mcg/act inhaler    promethazine-dextromethorphan (PHENERGAN-DM) 6.25-15 mg/5 mL oral syrup            Patient Instructions     Patient Instructions   Discussed symptoms are most likely viral in nature.  Will treat with Medrol Dosepak to calm albuterol inhaler and cough medication.  Take as instructed.  With any progression or worsening of symptoms return to be seen in ER.      Follow up with PCP in 3-5 days.  Proceed to  ER if symptoms worsen.    Chief Complaint     Chief Complaint   Patient presents with    Cough     And congestion started week ago          History of Present Illness       Patient is a 34-year-old male presenting today with cold-like symptoms x 1 week.  Patient notes over the last week he has had worsening nasal congestion, cough and fatigue.  Notes his girlfriend was recently diagnosed with bronchitis and he was in close contact with her.  Has had a slight productive cough and congestion, has been taking over-the-counter DayQuil and NyQuil which has provided some relief.  Notes he had to miss work today due to his symptoms.  Denies fever, chest tightness, SOB, N/V/D.        Review of Systems   Review of Systems   Constitutional:  Positive for fatigue. Negative for chills and fever.   HENT:  Positive for congestion. Negative for ear pain, sinus pressure, sore throat and trouble swallowing.    Eyes:  Negative for pain.   Respiratory:  Positive for cough. Negative for chest tightness and shortness of breath.    Cardiovascular:  Negative for chest pain.   Gastrointestinal:  Negative for abdominal pain.   Musculoskeletal:  Negative for myalgias.   Skin:  Negative for rash.    Neurological:  Negative for headaches.         Current Medications       Current Outpatient Medications:     acetaminophen (TYLENOL) 325 mg tablet, Take 2 tablets (650 mg total) by mouth every 6 (six) hours as needed for mild pain, headaches or fever Do not exceed a total of 3 grams of tylenol/acetaminophen in a 24-hour period., Disp: , Rfl:     albuterol (Proventil HFA) 90 mcg/act inhaler, Inhale 2 puffs every 6 (six) hours as needed for wheezing, Disp: 6.7 g, Rfl: 0    methylPREDNISolone 4 MG tablet therapy pack, Use as directed on package, Disp: 21 tablet, Rfl: 0    promethazine-dextromethorphan (PHENERGAN-DM) 6.25-15 mg/5 mL oral syrup, Take 5 mL by mouth 4 (four) times a day as needed for cough, Disp: 118 mL, Rfl: 0    atorvastatin (LIPITOR) 20 mg tablet, Take 1 tablet (20 mg total) by mouth daily after dinner (Patient not taking: Reported on 12/19/2024), Disp: 30 tablet, Rfl: 0    Cholecalciferol (VITAMIN D3) 1,000 units tablet, Take 2 tablets (2,000 Units total) by mouth daily To treat vitamin D deficiency (Patient not taking: Reported on 12/19/2024), Disp: 60 tablet, Rfl: 0    pantoprazole (PROTONIX) 40 mg tablet, Take 1 tablet (40 mg total) by mouth daily (Patient not taking: Reported on 12/19/2024), Disp: 30 tablet, Rfl: 5    tamsulosin (FLOMAX) 0.4 mg, Take 1 capsule (0.4 mg total) by mouth daily with dinner (Patient not taking: Reported on 12/19/2024), Disp: 30 capsule, Rfl: 0    Current Allergies     Allergies as of 12/19/2024    (No Known Allergies)            The following portions of the patient's history were reviewed and updated as appropriate: allergies, current medications, past family history, past medical history, past social history, past surgical history and problem list.     Past Medical History:   Diagnosis Date    Anxiety     Asthma     Depression        Past Surgical History:   Procedure Laterality Date    TONSILLECTOMY         No family history on file.      Medications have been  verified.        Objective   /93   Pulse 80   Temp 98.2 °F (36.8 °C)   Resp 18   SpO2 96%        Physical Exam     Physical Exam  Vitals reviewed.   Constitutional:       General: He is not in acute distress.     Appearance: He is well-developed. He is not toxic-appearing.   HENT:      Head: Normocephalic and atraumatic.      Right Ear: Tympanic membrane, ear canal and external ear normal.      Left Ear: Tympanic membrane, ear canal and external ear normal.      Nose: Congestion present.      Mouth/Throat:      Mouth: Mucous membranes are moist.      Pharynx: Oropharynx is clear.   Eyes:      Conjunctiva/sclera: Conjunctivae normal.   Cardiovascular:      Rate and Rhythm: Normal rate and regular rhythm.      Pulses: Normal pulses.      Heart sounds: Normal heart sounds.   Pulmonary:      Effort: Pulmonary effort is normal.      Breath sounds: Rhonchi present.      Comments: Slight expiratory rhonchi posterior bilaterally with significant resolution after coughing, SpO2 96% indicating adequate oxygenation  Musculoskeletal:      Cervical back: Normal range of motion. No tenderness.   Lymphadenopathy:      Cervical: No cervical adenopathy.   Skin:     General: Skin is warm.      Capillary Refill: Capillary refill takes less than 2 seconds.   Neurological:      General: No focal deficit present.      Mental Status: He is alert and oriented to person, place, and time.   Psychiatric:         Mood and Affect: Mood normal.

## 2024-12-19 NOTE — PATIENT INSTRUCTIONS
Discussed symptoms are most likely viral in nature.  Will treat with Medrol Dosepak to calm albuterol inhaler and cough medication.  Take as instructed.  With any progression or worsening of symptoms return to be seen in ER.

## 2024-12-19 NOTE — LETTER
December 19, 2024     Patient: Chin Torres   YOB: 1990   Date of Visit: 12/19/2024       To Whom It May Concern:    It is my medical opinion that Chin Torres may return to work on 12/20/2024 .    If you have any questions or concerns, please don't hesitate to call.         Sincerely,        James Jolly PA-C    CC: No Recipients

## 2025-02-18 ENCOUNTER — OFFICE VISIT (OUTPATIENT)
Dept: URGENT CARE | Facility: MEDICAL CENTER | Age: 35
End: 2025-02-18
Payer: COMMERCIAL

## 2025-02-18 VITALS
HEIGHT: 67 IN | WEIGHT: 262 LBS | OXYGEN SATURATION: 96 % | TEMPERATURE: 98.1 F | BODY MASS INDEX: 41.12 KG/M2 | RESPIRATION RATE: 18 BRPM | HEART RATE: 99 BPM

## 2025-02-18 DIAGNOSIS — S20.212A CONTUSION OF RIB ON LEFT SIDE, INITIAL ENCOUNTER: Primary | ICD-10-CM
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
## 2025-02-18 DIAGNOSIS — T14.8XXA MUSCLE STRAIN: ICD-10-CM

## 2025-02-18 PROCEDURE — 99212 OFFICE O/P EST SF 10 MIN: CPT | Performed by: PHYSICIAN ASSISTANT

## 2025-02-18 RX ORDER — METHOCARBAMOL 750 MG/1
750 TABLET, FILM COATED ORAL EVERY 6 HOURS PRN
Qty: 30 TABLET | Refills: 0 | Status: SHIPPED | OUTPATIENT
Start: 2025-02-18

## 2025-02-18 NOTE — PROGRESS NOTES
[facility]        NAME: Chin Torres is a 35 y.o. male  : 1990    MRN: [id number]  DATE: 2025  TIME: 8:56 AM    Assessment and Plan   Contusion of rib on left side, initial encounter [S20.212A]  1. Contusion of rib on left side, initial encounter        2. Muscle strain  methocarbamol (Robaxin-750) 750 mg tablet      3. Fall, initial encounter  methocarbamol (Robaxin-750) 750 mg tablet            Patient Instructions     Start Robaxin to relax muscles  Take Tylenol/Ibuprofen for pain  Moist heat  Gentle stretches  If symptoms fail to improve follow up with PCP    Follow up with PCP in 3-5 days.  Proceed to  ER if symptoms worsen.    If tests have been performed at [facility], our office will contact you with results if changes need to be made to the care plan discussed with you at the visit.  You can review your full results on [facility].    Chief Complaint     Chief Complaint   Patient presents with   • Fall     Pt fell down the stairs 2 days ago at home, head felt foggy, blacked out, Left leg/side/back/ribs/shoulder is painful, painful range of motion, 800mg of ibuprofen taken the first day         History of Present Illness       Patient presents with left rib pain left arm left leg pain after he fell down steps 2 days ago.  Patient states he blacked out for short period of time but got up quickly.  Patient had his girlfriend checked his pupils and he stated for the remaining of the day.  Yesterday he woke up sore but this morning when he woke up he was more sore.  Patient denies headache, changes in vision, light sensitivity, dizziness, nausea, vomiting, muscle weakness or paresthesia.        Review of Systems   Review of Systems   Constitutional:  Negative for fever.   Eyes:  Negative for photophobia and visual disturbance.   Respiratory:  Negative for chest tightness.    Cardiovascular:  Positive for chest pain (left sided rib pain).   Gastrointestinal:  Negative for  nausea and vomiting.   Neurological:  Negative for dizziness, weakness, numbness and headaches.         Current Medications       Current Outpatient Medications:   •  methocarbamol (Robaxin-750) 750 mg tablet, Take 1 tablet (750 mg total) by mouth every 6 (six) hours as needed for muscle spasms, Disp: 30 tablet, Rfl: 0  •  acetaminophen (TYLENOL) 325 mg tablet, Take 2 tablets (650 mg total) by mouth every 6 (six) hours as needed for mild pain, headaches or fever Do not exceed a total of 3 grams of tylenol/acetaminophen in a 24-hour period. (Patient not taking: Reported on 2/18/2025), Disp: , Rfl:   •  albuterol (Proventil HFA) 90 mcg/act inhaler, Inhale 2 puffs every 6 (six) hours as needed for wheezing (Patient not taking: Reported on 2/18/2025), Disp: 6.7 g, Rfl: 0  •  atorvastatin (LIPITOR) 20 mg tablet, Take 1 tablet (20 mg total) by mouth daily after dinner (Patient not taking: Reported on 2/18/2025), Disp: 30 tablet, Rfl: 0  •  Cholecalciferol (VITAMIN D3) 1,000 units tablet, Take 2 tablets (2,000 Units total) by mouth daily To treat vitamin D deficiency (Patient not taking: Reported on 2/18/2025), Disp: 60 tablet, Rfl: 0  •  methylPREDNISolone 4 MG tablet therapy pack, Use as directed on package (Patient not taking: Reported on 2/18/2025), Disp: 21 tablet, Rfl: 0  •  pantoprazole (PROTONIX) 40 mg tablet, Take 1 tablet (40 mg total) by mouth daily (Patient not taking: Reported on 2/18/2025), Disp: 30 tablet, Rfl: 5  •  promethazine-dextromethorphan (PHENERGAN-DM) 6.25-15 mg/5 mL oral syrup, Take 5 mL by mouth 4 (four) times a day as needed for cough (Patient not taking: Reported on 2/18/2025), Disp: 118 mL, Rfl: 0  •  tamsulosin (FLOMAX) 0.4 mg, Take 1 capsule (0.4 mg total) by mouth daily with dinner (Patient not taking: Reported on 6/10/2024), Disp: 30 capsule, Rfl: 0    Current Allergies     Allergies as of 02/18/2025   • (No Known Allergies)            The following portions of the patient's history were  "reviewed and updated as appropriate: allergies, current medications, past family history, past medical history, past social history, past surgical history and problem list.     Past Medical History:   Diagnosis Date   • Anxiety    • Asthma    • Depression        Past Surgical History:   Procedure Laterality Date   • TONSILLECTOMY         History reviewed. No pertinent family history.      Medications have been verified.        Objective   Pulse 99   Temp 98.1 °F (36.7 °C)   Resp 18   Ht 5' 7\" (1.702 m)   Wt 119 kg (262 lb)   SpO2 96%   BMI 41.04 kg/m²   No LMP for male patient.       Physical Exam     Physical Exam  Vitals and nursing note reviewed.   Constitutional:       Appearance: Normal appearance.   HENT:      Head: Normocephalic and atraumatic.      Right Ear: Tympanic membrane normal.      Left Ear: Tympanic membrane normal.      Nose: Nose normal.      Mouth/Throat:      Mouth: Mucous membranes are moist.      Pharynx: Oropharynx is clear.   Cardiovascular:      Rate and Rhythm: Normal rate and regular rhythm.      Heart sounds: Normal heart sounds.   Pulmonary:      Effort: Pulmonary effort is normal.      Breath sounds: Normal breath sounds.   Chest:      Comments: Tenderness over left lateral chest wall, no point tenderness, no increased pain with deep breathing  Musculoskeletal:      Cervical back: Normal range of motion. No tenderness.      Comments: Muscle tenderness left arm.   Skin:     General: Skin is warm.      Findings: No bruising or rash.   Neurological:      Mental Status: He is alert.      Cranial Nerves: No cranial nerve deficit.      Motor: No weakness.      Coordination: Coordination normal.      Gait: Gait normal.      Deep Tendon Reflexes: Reflexes normal.   Psychiatric:         Thought Content: Thought content normal.                   "

## 2025-02-18 NOTE — PATIENT INSTRUCTIONS
Start Robaxin to relax muscles  Take Tylenol/Ibuprofen for pain  Moist heat  Gentle stretches  If symptoms fail to improve follow up with PCP

## 2025-02-18 NOTE — LETTER
February 18, 2025     Patient: Chin Torres   YOB: 1990   Date of Visit: 2/18/2025       To Whom It May Concern:    It is my medical opinion that Chin Torres may return to work on 02/24/25, may return sooner if symsptoms improve .    If you have any questions or concerns, please don't hesitate to call.         Sincerely,  /24/25      Nichol Martino PA-C    CC: No Recipients